# Patient Record
Sex: MALE | Race: WHITE | NOT HISPANIC OR LATINO | Employment: OTHER | URBAN - METROPOLITAN AREA
[De-identification: names, ages, dates, MRNs, and addresses within clinical notes are randomized per-mention and may not be internally consistent; named-entity substitution may affect disease eponyms.]

---

## 2018-04-29 ENCOUNTER — HOSPITAL ENCOUNTER (EMERGENCY)
Facility: HOSPITAL | Age: 38
Discharge: HOME | End: 2018-04-29
Attending: EMERGENCY MEDICINE | Admitting: EMERGENCY MEDICINE

## 2018-04-29 VITALS
RESPIRATION RATE: 20 BRPM | SYSTOLIC BLOOD PRESSURE: 127 MMHG | HEART RATE: 91 BPM | DIASTOLIC BLOOD PRESSURE: 83 MMHG | TEMPERATURE: 99.2 F | OXYGEN SATURATION: 95 % | WEIGHT: 160 LBS | BODY MASS INDEX: 23.7 KG/M2 | HEIGHT: 69 IN

## 2018-04-29 DIAGNOSIS — M76.61 ACHILLES TENDINITIS OF RIGHT LOWER EXTREMITY: Primary | ICD-10-CM

## 2018-04-29 PROCEDURE — 99282 EMERGENCY DEPT VISIT SF MDM: CPT

## 2019-11-05 ENCOUNTER — APPOINTMENT (OUTPATIENT)
Dept: GENERAL RADIOLOGY | Facility: HOSPITAL | Age: 39
End: 2019-11-05

## 2019-11-05 ENCOUNTER — HOSPITAL ENCOUNTER (EMERGENCY)
Facility: HOSPITAL | Age: 39
Discharge: LEFT BEFORE DISCHARGE | End: 2019-11-05
Admitting: EMERGENCY MEDICINE

## 2019-11-05 VITALS
RESPIRATION RATE: 20 BRPM | OXYGEN SATURATION: 100 % | TEMPERATURE: 98.3 F | WEIGHT: 175 LBS | SYSTOLIC BLOOD PRESSURE: 122 MMHG | HEIGHT: 69 IN | HEART RATE: 70 BPM | BODY MASS INDEX: 25.92 KG/M2 | DIASTOLIC BLOOD PRESSURE: 71 MMHG

## 2019-11-05 DIAGNOSIS — R07.9 CHEST PAIN, UNSPECIFIED TYPE: Primary | ICD-10-CM

## 2019-11-05 LAB
ANION GAP SERPL CALCULATED.3IONS-SCNC: 10 MMOL/L (ref 5–15)
BASOPHILS # BLD AUTO: 0.1 10*3/MM3 (ref 0–0.2)
BASOPHILS NFR BLD AUTO: 0.9 % (ref 0–1.5)
BUN BLD-MCNC: 12 MG/DL (ref 6–20)
BUN/CREAT SERPL: 14.6 (ref 7–25)
CALCIUM SPEC-SCNC: 9.8 MG/DL (ref 8.6–10.5)
CHLORIDE SERPL-SCNC: 103 MMOL/L (ref 98–107)
CO2 SERPL-SCNC: 24 MMOL/L (ref 22–29)
CREAT BLD-MCNC: 0.82 MG/DL (ref 0.76–1.27)
DEPRECATED RDW RBC AUTO: 45.1 FL (ref 37–54)
EOSINOPHIL # BLD AUTO: 0.2 10*3/MM3 (ref 0–0.4)
EOSINOPHIL NFR BLD AUTO: 2 % (ref 0.3–6.2)
ERYTHROCYTE [DISTWIDTH] IN BLOOD BY AUTOMATED COUNT: 12.8 % (ref 12.3–15.4)
GFR SERPL CREATININE-BSD FRML MDRD: 105 ML/MIN/1.73
GLUCOSE BLD-MCNC: 107 MG/DL (ref 65–99)
HCT VFR BLD AUTO: 46.6 % (ref 37.5–51)
HGB BLD-MCNC: 16.5 G/DL (ref 13–17.7)
INR PPP: 1.02 (ref 0.9–1.1)
LYMPHOCYTES # BLD AUTO: 2.3 10*3/MM3 (ref 0.7–3.1)
LYMPHOCYTES NFR BLD AUTO: 19 % (ref 19.6–45.3)
MCH RBC QN AUTO: 35.3 PG (ref 26.6–33)
MCHC RBC AUTO-ENTMCNC: 35.4 G/DL (ref 31.5–35.7)
MCV RBC AUTO: 99.7 FL (ref 79–97)
MONOCYTES # BLD AUTO: 0.9 10*3/MM3 (ref 0.1–0.9)
MONOCYTES NFR BLD AUTO: 7.3 % (ref 5–12)
NEUTROPHILS # BLD AUTO: 8.4 10*3/MM3 (ref 1.7–7)
NEUTROPHILS NFR BLD AUTO: 70.8 % (ref 42.7–76)
NRBC BLD AUTO-RTO: 0 /100 WBC (ref 0–0.2)
PLATELET # BLD AUTO: 250 10*3/MM3 (ref 140–450)
PMV BLD AUTO: 8.2 FL (ref 6–12)
POTASSIUM BLD-SCNC: 4.1 MMOL/L (ref 3.5–5.2)
PROTHROMBIN TIME: 10.6 SECONDS (ref 9.6–11.7)
RBC # BLD AUTO: 4.67 10*6/MM3 (ref 4.14–5.8)
SODIUM BLD-SCNC: 137 MMOL/L (ref 136–145)
TROPONIN T SERPL-MCNC: <0.01 NG/ML (ref 0–0.03)
WBC NRBC COR # BLD: 11.9 10*3/MM3 (ref 3.4–10.8)

## 2019-11-05 PROCEDURE — 99284 EMERGENCY DEPT VISIT MOD MDM: CPT

## 2019-11-05 PROCEDURE — 85610 PROTHROMBIN TIME: CPT | Performed by: NURSE PRACTITIONER

## 2019-11-05 PROCEDURE — 84484 ASSAY OF TROPONIN QUANT: CPT | Performed by: NURSE PRACTITIONER

## 2019-11-05 PROCEDURE — 80048 BASIC METABOLIC PNL TOTAL CA: CPT | Performed by: NURSE PRACTITIONER

## 2019-11-05 PROCEDURE — 85025 COMPLETE CBC W/AUTO DIFF WBC: CPT | Performed by: NURSE PRACTITIONER

## 2019-11-05 PROCEDURE — 93005 ELECTROCARDIOGRAM TRACING: CPT

## 2019-11-05 PROCEDURE — 71045 X-RAY EXAM CHEST 1 VIEW: CPT

## 2019-11-05 RX ORDER — ASPIRIN 81 MG/1
324 TABLET, CHEWABLE ORAL ONCE
Status: COMPLETED | OUTPATIENT
Start: 2019-11-05 | End: 2019-11-05

## 2019-11-05 RX ORDER — SODIUM CHLORIDE 0.9 % (FLUSH) 0.9 %
10 SYRINGE (ML) INJECTION AS NEEDED
Status: DISCONTINUED | OUTPATIENT
Start: 2019-11-05 | End: 2019-11-05 | Stop reason: HOSPADM

## 2019-11-05 RX ORDER — CHOLECALCIFEROL (VITAMIN D3) 125 MCG
1000 CAPSULE ORAL DAILY
COMMUNITY
End: 2021-07-19

## 2019-11-05 RX ORDER — MULTIPLE VITAMINS W/ MINERALS TAB 9MG-400MCG
1 TAB ORAL DAILY
COMMUNITY

## 2019-11-05 RX ADMIN — ASPIRIN 81 MG 324 MG: 81 TABLET ORAL at 10:36

## 2019-11-05 RX ADMIN — NITROGLYCERIN 1 INCH: 20 OINTMENT TOPICAL at 10:37

## 2019-11-05 NOTE — ED PROVIDER NOTES
Subjective   Chief Complaint: Chest pain  Context: Patient reports chest pain that started approximately an hour prior to arrival.  He reports he has a history of anxiety/panic attacks but this feels different.  He reports a family history of heart disease with an uncle at age 40.  He denies shortness of breath or nausea.  He denies radiation of pain.  He reports the pain is not reproducible.  He is a smoker.  No prior stress test or heart cath.  Duration: 1 hour  Timing: Constant  Severity: Mild to moderate  Associated symptoms and or modifying factors: As above          History provided by:  Patient      Review of Systems   Constitutional: Negative for chills and fever.   HENT: Negative for congestion and sore throat.    Eyes: Negative for redness.   Respiratory: Negative for cough and shortness of breath.    Cardiovascular: Positive for chest pain.   Gastrointestinal: Negative for abdominal pain, diarrhea, nausea and vomiting.   Genitourinary: Negative for dysuria.   Musculoskeletal: Negative for back pain.   Skin: Negative for rash.   Neurological: Negative for headaches.   All other systems reviewed and are negative.      Past Medical History:   Diagnosis Date   • Kidney stone        Allergies   Allergen Reactions   • Penicillins Unknown (See Comments)     States he is unsure       Past Surgical History:   Procedure Laterality Date   • SHOULDER SURGERY         History reviewed. No pertinent family history.    Social History     Socioeconomic History   • Marital status: Single     Spouse name: Not on file   • Number of children: Not on file   • Years of education: Not on file   • Highest education level: Not on file   Tobacco Use   • Smoking status: Current Every Day Smoker     Packs/day: 0.50     Types: Cigarettes   • Smokeless tobacco: Never Used   Substance and Sexual Activity   • Alcohol use: Yes     Comment: occassionally   • Drug use: Yes     Types: Marijuana     Comment: occassionally   • Sexual activity:  Defer           Objective   Physical Exam  The patient is well-developed, well-nourished, alert, cooperative and in no acute distress.    HEENT exam is normocephalic and atraumatic. Pupils equal ,round, reactive to light. Extraocular muscles are intact. Conjunctivae non- injected, sclerae anicteric, lids without ptosis, edema or erythema.  Mucous membranes are moist.     Neck is supple, non-tender without lymphadenopathy. No JVD, bruit or stridor noted.     Lungs clear to auscultation bilaterally.    Cardiovascular. Regular rate and rhythm without murmur.    Chest is not tender, no subcutaneous emphysema or crepitus felt.     Abdomen is soft, nontender, nondistended. No guarding or rebound noted.     Back shows no CVA tenderness.     Extremities: There is no significant deformity or joint abnormality. No edema.     Neurologic: Oriented x3 without focal neurological deficits.    Skin shows no rash, petechiae, or purpura.    Procedures           ED Course  ED Course as of Nov 05 1145   Tue Nov 05, 2019   1143 BC 11.9, glucose 107, initial troponin is negative.  Chest x-ray shows no acute cardiopulmonary abnormality.  [AC]      ED Course User Index  [AC] Laney Bucio APRN      Labs Reviewed   BASIC METABOLIC PANEL - Abnormal; Notable for the following components:       Result Value    Glucose 107 (*)     All other components within normal limits    Narrative:     GFR Normal >60  Chronic Kidney Disease <60  Kidney Failure <15   CBC WITH AUTO DIFFERENTIAL - Abnormal; Notable for the following components:    WBC 11.90 (*)     MCV 99.7 (*)     MCH 35.3 (*)     Lymphocyte % 19.0 (*)     Neutrophils, Absolute 8.40 (*)     All other components within normal limits   PROTIME-INR - Normal   TROPONIN (IN-HOUSE) - Normal    Narrative:     Troponin T Reference Range:  <= 0.03 ng/mL-   Negative for AMI  >0.03 ng/mL-     Abnormal for myocardial necrosis.  Clinicians would have to utilize clinical acumen, EKG, Troponin and  "serial changes to determine if it is an Acute Myocardial Infarction or myocardial injury due to an underlying chronic condition.    CBC AND DIFFERENTIAL    Narrative:     The following orders were created for panel order CBC & Differential.  Procedure                               Abnormality         Status                     ---------                               -----------         ------                     CBC Auto Differential[[id number]]        Abnormal            Final result                 Please view results for these tests on the individual orders.     Xr Chest 1 View    Result Date: 11/5/2019   1. No acute cardiopulmonary disease.   Electronically Signed By-Juvencio Amin On:11/5/2019 10:52 AM This report was finalized on [id number] by  Juvencio Amin, .    Medications   sodium chloride 0.9 % flush 10 mL (not administered)   aspirin chewable tablet 324 mg (324 mg Oral Given 11/5/19 1036)   nitroglycerin (NITROSTAT) ointment 1 inch (1 inch Topical Given 11/5/19 1037)     /74   Pulse 71   Temp 98.3 °F (36.8 °C)   Resp 19   Ht 175.3 cm (69\")   Wt 79.4 kg (175 lb)   SpO2 98%   BMI 25.84 kg/m²     EKG interpreted by Dr. Garcia, sinus rhythm, rate 76          MDM  Number of Diagnoses or Management Options  Chest pain, unspecified type:   Diagnosis management comments: MEDICAL DECISION  Comorbidities: Anxiety, kidney stones  Differentials: Jaylene/panic attack, STEMI, non-STEMI, pneumonia; this list is not all inclusive and does not constitute the entirety of considered causes  Radiology interpretation:  Images reviewed by me and interpreted by radiologist, chest x-ray no acute cardiopulmonary abnormality  Lab interpretation:  Labs viewed by me significant for, initial troponin is negative.    Results were discussed with patient.  He reports that he is currently pain-free.  He was advised that it would be recommended that he be admitted to the hospital to rule out that this was cardiac in nature.  " He reports that he is unable to state in the hospital because of starting a new job.  He was advised that he can sign out AGAINST MEDICAL ADVICE and that he was taking the risk that this was heart related with worst-case scenario sudden death.  He voices understanding.  He was encouraged to follow-up with primary care as well as cardiology and he was instructed to return to the emergency department for any new or worsening concerns.         Amount and/or Complexity of Data Reviewed  Clinical lab tests: reviewed and ordered  Tests in the radiology section of CPT®: reviewed and ordered  Tests in the medicine section of CPT®: reviewed        Final diagnoses:   Chest pain, unspecified type              Laney Bucio, APRN  11/05/19 1146

## 2019-11-05 NOTE — ED NOTES
Pt states his chest pain started about an hr ago. Pt states he does get anxiety attacks which is what he thought this might be, but that they usually go away and this has not. Pt reports he takes pre work out supplements and thinks it could be a factor     Kush Grant, LPN  11/05/19 1024

## 2019-11-05 NOTE — DISCHARGE INSTRUCTIONS
You are signing out against medical advise. You are taking the risk that this may be your heart.  Follow up in the next 1-2 days with your primary care and cardiologist.  You may follow up with the ones listed if you do not have anyone you routinely see.  Feel free to return to the ER at any time for new or worsening concerns.

## 2019-12-16 ENCOUNTER — HOSPITAL ENCOUNTER (OUTPATIENT)
Dept: HOSPITAL 5 - CT | Age: 39
Discharge: HOME | End: 2019-12-16
Attending: INTERNAL MEDICINE
Payer: OTHER GOVERNMENT

## 2019-12-16 DIAGNOSIS — Z01.89: Primary | ICD-10-CM

## 2019-12-16 DIAGNOSIS — D32.9: ICD-10-CM

## 2019-12-16 LAB — BUN SERPL-MCNC: 9 MG/DL (ref 9–20)

## 2019-12-16 PROCEDURE — 82565 ASSAY OF CREATININE: CPT

## 2019-12-16 PROCEDURE — 70480 CT ORBIT/EAR/FOSSA W/O DYE: CPT

## 2019-12-16 PROCEDURE — 84520 ASSAY OF UREA NITROGEN: CPT

## 2019-12-16 PROCEDURE — 36415 COLL VENOUS BLD VENIPUNCTURE: CPT

## 2019-12-16 NOTE — CAT SCAN REPORT
CT orbit/ear/fossa wo con



INDICATION / CLINICAL INFORMATION:

39 years Male; Z01.89 other specified special examinations.



TECHNIQUE:

Thin cut axial images obtained to the orbits. Sagittal and coronal reconstructions performed. All CT 
scans at this location are performed using CT dose reduction for ALARA by means of automated exposure
 control. 



COMPARISON:

None available.



FINDINGS:

Concerning the orbits, the globes are normal in appearance, as are the extraocular muscles. There is 
a small, well-circumscribed lesion between the optic nerve and superior rectus muscle on the left-sma
ll orbital hemangioma might be considered. This finding measures approximately 6 mm in maximum dimens
ion.



Retro-orbital structures are otherwise grossly normal.



There is some component of fibrous dysplasia and Paget's disease seen in the sphenoid bone on the rig
ht which extends along the lateral orbit. Similar findings to lesser degree seen in the sphenoid wing
 on the left.



Temporal bones are grossly normal in appearance.



There is minimal mucosal thickening in the ethmoids. Mastoid air cells are essentially clear.



IMPRESSION:

1. No definitive cause for patient's symptomatology seen.

2. 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 Roverto
rison with any prior exam or follow-up may be of benefit to ensure stability of this finding.

3. 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
eater than left. 



Signer Name: Esdras Whalen MD, III 

Signed: 12/16/2019 5:59 PM

 Workstation Name: [facility] [id number]

## 2020-05-23 ENCOUNTER — HOSPITAL ENCOUNTER (EMERGENCY)
Facility: HOSPITAL | Age: 40
Discharge: HOME | End: 2020-05-24
Attending: EMERGENCY MEDICINE | Admitting: EMERGENCY MEDICINE

## 2020-05-23 DIAGNOSIS — R00.2 PALPITATIONS: Primary | ICD-10-CM

## 2020-05-23 PROCEDURE — 99283 EMERGENCY DEPT VISIT LOW MDM: CPT

## 2020-05-23 PROCEDURE — 93005 ELECTROCARDIOGRAM TRACING: CPT | Performed by: EMERGENCY MEDICINE

## 2020-05-24 VITALS
DIASTOLIC BLOOD PRESSURE: 81 MMHG | WEIGHT: 179.01 LBS | OXYGEN SATURATION: 99 % | BODY MASS INDEX: 26.51 KG/M2 | RESPIRATION RATE: 16 BRPM | HEART RATE: 88 BPM | TEMPERATURE: 97.9 F | SYSTOLIC BLOOD PRESSURE: 128 MMHG | HEIGHT: 69 IN

## 2020-05-24 NOTE — ED PROVIDER NOTES
Subjective   39-year-old male reports palpitations and anxiety earlier this evening, resolved.  No exertional symptoms.  No chest pain or shortness of air.  Patient drank a pre-workout supplement at 5 PM containing approximately 200 mg of caffeine.  Patient worked out, had no symptoms at that time.  Patient got home and drank some vodka and a another energy drink and experienced fast palpitations and anxiety.  Symptoms resolved.  Symptoms were moderate, worse with caffeine.          Review of Systems   Cardiovascular: Positive for palpitations.   Psychiatric/Behavioral: The patient is nervous/anxious.    All other systems reviewed and are negative.      Past Medical History:   Diagnosis Date   • Kidney stone        Allergies   Allergen Reactions   • Penicillins Unknown (See Comments)     States he is unsure       Past Surgical History:   Procedure Laterality Date   • SHOULDER SURGERY         No family history on file.    Social History     Socioeconomic History   • Marital status: Single     Spouse name: Not on file   • Number of children: Not on file   • Years of education: Not on file   • Highest education level: Not on file   Tobacco Use   • Smoking status: Current Every Day Smoker     Packs/day: 0.50     Types: Cigarettes   • Smokeless tobacco: Never Used   Substance and Sexual Activity   • Alcohol use: Yes     Comment: occassionally   • Drug use: Yes     Types: Marijuana     Comment: occassionally   • Sexual activity: Defer           Objective   Physical Exam   Constitutional: He is oriented to person, place, and time. He appears well-developed and well-nourished.   HENT:   Head: Normocephalic and atraumatic.   Mouth/Throat: Oropharynx is clear and moist.   Eyes: Pupils are equal, round, and reactive to light. Conjunctivae are normal.   Neck: Normal range of motion. Neck supple.   Cardiovascular: Normal rate, regular rhythm and normal heart sounds.   Pulmonary/Chest: Effort normal and breath sounds normal.    Abdominal: Soft. Bowel sounds are normal. He exhibits no distension. There is no tenderness.   Musculoskeletal: Normal range of motion. He exhibits no edema or tenderness.   Neurological: He is alert and oriented to person, place, and time.   Skin: Skin is warm and dry. Capillary refill takes less than 2 seconds.   Psychiatric: He has a normal mood and affect. His behavior is normal.       Procedures           ED Course  ED Course as of May 24 0013   Sun May 24, 2020   0000 EKG interpretation: Normal sinus rhythm, rate 90, no acute ST change    [JR]      ED Course User Index  [JR] Julian Triplett MD                                           [facility]    Final diagnoses:   Palpitations            Julian Triplett MD  05/24/20 0016

## 2020-07-29 ENCOUNTER — HOSPITAL ENCOUNTER (EMERGENCY)
Facility: HOSPITAL | Age: 40
Discharge: HOME | End: 2020-07-29
Attending: EMERGENCY MEDICINE | Admitting: EMERGENCY MEDICINE

## 2020-07-29 ENCOUNTER — APPOINTMENT (OUTPATIENT)
Dept: GENERAL RADIOLOGY | Facility: HOSPITAL | Age: 40
End: 2020-07-29

## 2020-07-29 VITALS
HEIGHT: 69 IN | HEART RATE: 79 BPM | RESPIRATION RATE: 19 BRPM | WEIGHT: 175 LBS | OXYGEN SATURATION: 100 % | DIASTOLIC BLOOD PRESSURE: 76 MMHG | BODY MASS INDEX: 25.92 KG/M2 | SYSTOLIC BLOOD PRESSURE: 108 MMHG | TEMPERATURE: 97.9 F

## 2020-07-29 DIAGNOSIS — R10.9 ABDOMINAL PAIN, UNSPECIFIED ABDOMINAL LOCATION: Primary | ICD-10-CM

## 2020-07-29 DIAGNOSIS — E86.0 DEHYDRATION: ICD-10-CM

## 2020-07-29 LAB
ALBUMIN SERPL-MCNC: 4.7 G/DL (ref 3.5–5.2)
ALBUMIN/GLOB SERPL: 1.9 G/DL
ALP SERPL-CCNC: 68 U/L (ref 39–117)
ALT SERPL W P-5'-P-CCNC: 18 U/L (ref 1–41)
ANION GAP SERPL CALCULATED.3IONS-SCNC: 15 MMOL/L (ref 5–15)
AST SERPL-CCNC: 19 U/L (ref 1–40)
BASOPHILS # BLD AUTO: 0 10*3/MM3 (ref 0–0.2)
BASOPHILS NFR BLD AUTO: 0.3 % (ref 0–1.5)
BILIRUB SERPL-MCNC: 0.6 MG/DL (ref 0–1.2)
BILIRUB UR QL STRIP: ABNORMAL
BUN SERPL-MCNC: 13 MG/DL (ref 6–20)
BUN SERPL-MCNC: ABNORMAL MG/DL
BUN/CREAT SERPL: ABNORMAL
CALCIUM SPEC-SCNC: 9.2 MG/DL (ref 8.6–10.5)
CHLORIDE SERPL-SCNC: 98 MMOL/L (ref 98–107)
CLARITY UR: CLEAR
CO2 SERPL-SCNC: 26 MMOL/L (ref 22–29)
COLOR UR: ABNORMAL
CREAT SERPL-MCNC: 1.17 MG/DL (ref 0.76–1.27)
DEPRECATED RDW RBC AUTO: 45.3 FL (ref 37–54)
EOSINOPHIL # BLD AUTO: 0.1 10*3/MM3 (ref 0–0.4)
EOSINOPHIL NFR BLD AUTO: 1.2 % (ref 0.3–6.2)
ERYTHROCYTE [DISTWIDTH] IN BLOOD BY AUTOMATED COUNT: 13.4 % (ref 12.3–15.4)
GFR SERPL CREATININE-BSD FRML MDRD: 69 ML/MIN/1.73
GLOBULIN UR ELPH-MCNC: 2.5 GM/DL
GLUCOSE SERPL-MCNC: 153 MG/DL (ref 65–99)
GLUCOSE UR STRIP-MCNC: NEGATIVE MG/DL
HCT VFR BLD AUTO: 48 % (ref 37.5–51)
HGB BLD-MCNC: 16.5 G/DL (ref 13–17.7)
HGB UR QL STRIP.AUTO: NEGATIVE
HOLD SPECIMEN: NORMAL
KETONES UR QL STRIP: ABNORMAL
LEUKOCYTE ESTERASE UR QL STRIP.AUTO: NEGATIVE
LIPASE SERPL-CCNC: 22 U/L (ref 13–60)
LYMPHOCYTES # BLD AUTO: 2.7 10*3/MM3 (ref 0.7–3.1)
LYMPHOCYTES NFR BLD AUTO: 28 % (ref 19.6–45.3)
MCH RBC QN AUTO: 33.6 PG (ref 26.6–33)
MCHC RBC AUTO-ENTMCNC: 34.5 G/DL (ref 31.5–35.7)
MCV RBC AUTO: 97.5 FL (ref 79–97)
MONOCYTES # BLD AUTO: 0.8 10*3/MM3 (ref 0.1–0.9)
MONOCYTES NFR BLD AUTO: 8.6 % (ref 5–12)
NEUTROPHILS NFR BLD AUTO: 6.1 10*3/MM3 (ref 1.7–7)
NEUTROPHILS NFR BLD AUTO: 61.9 % (ref 42.7–76)
NITRITE UR QL STRIP: NEGATIVE
NRBC BLD AUTO-RTO: 0.1 /100 WBC (ref 0–0.2)
PH UR STRIP.AUTO: 5.5 [PH] (ref 5–8)
PLATELET # BLD AUTO: 349 10*3/MM3 (ref 140–450)
PMV BLD AUTO: 8.3 FL (ref 6–12)
POTASSIUM SERPL-SCNC: 3.6 MMOL/L (ref 3.5–5.2)
PROT SERPL-MCNC: 7.2 G/DL (ref 6–8.5)
PROT UR QL STRIP: ABNORMAL
RBC # BLD AUTO: 4.92 10*6/MM3 (ref 4.14–5.8)
S PYO AG THROAT QL: NEGATIVE
SODIUM SERPL-SCNC: 139 MMOL/L (ref 136–145)
SP GR UR STRIP: >=1.03 (ref 1–1.03)
UROBILINOGEN UR QL STRIP: ABNORMAL
WBC # BLD AUTO: 9.8 10*3/MM3 (ref 3.4–10.8)
WHOLE BLOOD HOLD SPECIMEN: NORMAL

## 2020-07-29 PROCEDURE — 81003 URINALYSIS AUTO W/O SCOPE: CPT | Performed by: EMERGENCY MEDICINE

## 2020-07-29 PROCEDURE — 71045 X-RAY EXAM CHEST 1 VIEW: CPT

## 2020-07-29 PROCEDURE — 87651 STREP A DNA AMP PROBE: CPT | Performed by: EMERGENCY MEDICINE

## 2020-07-29 PROCEDURE — 85025 COMPLETE CBC W/AUTO DIFF WBC: CPT | Performed by: EMERGENCY MEDICINE

## 2020-07-29 PROCEDURE — 80053 COMPREHEN METABOLIC PANEL: CPT | Performed by: EMERGENCY MEDICINE

## 2020-07-29 PROCEDURE — 25010000002 ONDANSETRON PER 1 MG: Performed by: EMERGENCY MEDICINE

## 2020-07-29 PROCEDURE — 96374 THER/PROPH/DIAG INJ IV PUSH: CPT

## 2020-07-29 PROCEDURE — 83690 ASSAY OF LIPASE: CPT | Performed by: EMERGENCY MEDICINE

## 2020-07-29 PROCEDURE — 99284 EMERGENCY DEPT VISIT MOD MDM: CPT

## 2020-07-29 RX ORDER — ONDANSETRON 4 MG/1
4 TABLET, FILM COATED ORAL EVERY 8 HOURS PRN
Qty: 20 TABLET | Refills: 0 | OUTPATIENT
Start: 2020-07-29 | End: 2020-11-10

## 2020-07-29 RX ORDER — ONDANSETRON 2 MG/ML
4 INJECTION INTRAMUSCULAR; INTRAVENOUS ONCE
Status: COMPLETED | OUTPATIENT
Start: 2020-07-29 | End: 2020-07-29

## 2020-07-29 RX ADMIN — SODIUM CHLORIDE 1000 ML: 900 INJECTION, SOLUTION INTRAVENOUS at 05:18

## 2020-07-29 RX ADMIN — ONDANSETRON 4 MG: 2 INJECTION, SOLUTION INTRAMUSCULAR; INTRAVENOUS at 05:18

## 2020-11-10 ENCOUNTER — HOSPITAL ENCOUNTER (EMERGENCY)
Facility: HOSPITAL | Age: 40
Discharge: HOME | End: 2020-11-10
Admitting: EMERGENCY MEDICINE

## 2020-11-10 VITALS
BODY MASS INDEX: 25.67 KG/M2 | SYSTOLIC BLOOD PRESSURE: 128 MMHG | WEIGHT: 173.28 LBS | TEMPERATURE: 98.1 F | HEART RATE: 86 BPM | DIASTOLIC BLOOD PRESSURE: 88 MMHG | OXYGEN SATURATION: 100 % | RESPIRATION RATE: 18 BRPM | HEIGHT: 69 IN

## 2020-11-10 DIAGNOSIS — F10.21 HISTORY OF ALCOHOLISM (HCC): ICD-10-CM

## 2020-11-10 DIAGNOSIS — Z00.00 NORMAL EXAM: Primary | ICD-10-CM

## 2020-11-10 LAB
ALBUMIN SERPL-MCNC: 4.4 G/DL (ref 3.5–5.2)
ALBUMIN/GLOB SERPL: 1.8 G/DL
ALP SERPL-CCNC: 85 U/L (ref 39–117)
ALT SERPL W P-5'-P-CCNC: 19 U/L (ref 1–41)
ANION GAP SERPL CALCULATED.3IONS-SCNC: 10 MMOL/L (ref 5–15)
AST SERPL-CCNC: 21 U/L (ref 1–40)
BASOPHILS # BLD AUTO: 0 10*3/MM3 (ref 0–0.2)
BASOPHILS NFR BLD AUTO: 0.5 % (ref 0–1.5)
BILIRUB SERPL-MCNC: 0.6 MG/DL (ref 0–1.2)
BUN SERPL-MCNC: 10 MG/DL (ref 6–20)
BUN/CREAT SERPL: 9.5 (ref 7–25)
CALCIUM SPEC-SCNC: 9.3 MG/DL (ref 8.6–10.5)
CHLORIDE SERPL-SCNC: 102 MMOL/L (ref 98–107)
CO2 SERPL-SCNC: 28 MMOL/L (ref 22–29)
CREAT SERPL-MCNC: 1.05 MG/DL (ref 0.76–1.27)
DEPRECATED RDW RBC AUTO: 43.3 FL (ref 37–54)
EOSINOPHIL # BLD AUTO: 0.1 10*3/MM3 (ref 0–0.4)
EOSINOPHIL NFR BLD AUTO: 1.5 % (ref 0.3–6.2)
ERYTHROCYTE [DISTWIDTH] IN BLOOD BY AUTOMATED COUNT: 13.1 % (ref 12.3–15.4)
GFR SERPL CREATININE-BSD FRML MDRD: 78 ML/MIN/1.73
GLOBULIN UR ELPH-MCNC: 2.4 GM/DL
GLUCOSE SERPL-MCNC: 105 MG/DL (ref 65–99)
HCT VFR BLD AUTO: 48.8 % (ref 37.5–51)
HGB BLD-MCNC: 16.8 G/DL (ref 13–17.7)
LYMPHOCYTES # BLD AUTO: 1.7 10*3/MM3 (ref 0.7–3.1)
LYMPHOCYTES NFR BLD AUTO: 20.1 % (ref 19.6–45.3)
MCH RBC QN AUTO: 33.2 PG (ref 26.6–33)
MCHC RBC AUTO-ENTMCNC: 34.5 G/DL (ref 31.5–35.7)
MCV RBC AUTO: 96.1 FL (ref 79–97)
MONOCYTES # BLD AUTO: 0.5 10*3/MM3 (ref 0.1–0.9)
MONOCYTES NFR BLD AUTO: 6.1 % (ref 5–12)
NEUTROPHILS NFR BLD AUTO: 6.2 10*3/MM3 (ref 1.7–7)
NEUTROPHILS NFR BLD AUTO: 71.8 % (ref 42.7–76)
NRBC BLD AUTO-RTO: 0.1 /100 WBC (ref 0–0.2)
PLATELET # BLD AUTO: 274 10*3/MM3 (ref 140–450)
PMV BLD AUTO: 7.5 FL (ref 6–12)
POTASSIUM SERPL-SCNC: 3.9 MMOL/L (ref 3.5–5.2)
PROT SERPL-MCNC: 6.8 G/DL (ref 6–8.5)
RBC # BLD AUTO: 5.07 10*6/MM3 (ref 4.14–5.8)
SODIUM SERPL-SCNC: 140 MMOL/L (ref 136–145)
WBC # BLD AUTO: 8.7 10*3/MM3 (ref 3.4–10.8)

## 2020-11-10 PROCEDURE — 80053 COMPREHEN METABOLIC PANEL: CPT | Performed by: NURSE PRACTITIONER

## 2020-11-10 PROCEDURE — 85025 COMPLETE CBC W/AUTO DIFF WBC: CPT | Performed by: NURSE PRACTITIONER

## 2020-11-10 PROCEDURE — 99283 EMERGENCY DEPT VISIT LOW MDM: CPT

## 2020-11-10 RX ORDER — CHLORDIAZEPOXIDE HYDROCHLORIDE 25 MG/1
25 CAPSULE, GELATIN COATED ORAL 4 TIMES DAILY PRN
Qty: 12 CAPSULE | Refills: 0 | Status: SHIPPED | OUTPATIENT
Start: 2020-11-10 | End: 2020-11-13

## 2020-11-10 NOTE — ED PROVIDER NOTES
Subjective   Chief complaint: etoh withdrawal      Context: Patient is a 40-year-old male who comes in with stating he has been a heavy drinker for the last several years from a couple drinks to a bottle with his last drink yesterday around 8 PM.  He states he has set up for outpatient treatment and AA and is wanting to stop drinking.  He denies any hallucinations paresthesias.. He states he has no history of withdrawal seizures.  He denies any recreational drug use.  He states he was told to come in and get medication to help with the withdrawal from his AA sponsor.  He denies any nausea or vomiting.  He denies any agitation or anxiety.  Denies any sweating.  He denies any headache or hallucinations.  He reports he does have an occasional tremor but none today.    Duration: As above    Timing:    Severity: None    Associated symptoms: Denies          PCP:  fransisco            Review of Systems   Constitutional: Negative for fever.   HENT: Negative.    Eyes: Negative for visual disturbance.   Respiratory: Negative.    Cardiovascular: Negative.    Gastrointestinal: Negative.    Genitourinary: Negative.    Musculoskeletal: Negative.    Skin: Negative.    Allergic/Immunologic: Negative for immunocompromised state.   Neurological: Negative.    Hematological: Does not bruise/bleed easily.   Psychiatric/Behavioral: Negative.        Past Medical History:   Diagnosis Date   • Kidney stone        Allergies   Allergen Reactions   • Penicillins Unknown (See Comments)     States he is unsure       Past Surgical History:   Procedure Laterality Date   • SHOULDER SURGERY         No family history on file.    Social History     Socioeconomic History   • Marital status:      Spouse name: Not on file   • Number of children: Not on file   • Years of education: Not on file   • Highest education level: Not on file   Tobacco Use   • Smoking status: Current Every Day Smoker     Packs/day: 0.50     Types: Cigarettes   • Smokeless  tobacco: Never Used   Substance and Sexual Activity   • Alcohol use: Yes     Comment: occassionally   • Drug use: Yes     Types: Marijuana     Comment: occassionally   • Sexual activity: Defer           Objective   Physical Exam     Vital signs and triage nurse note reviewed.   Constitutional: Awake, alert; well-developed and well-nourished. No acute distress is noted.   HEENT: Normocephalic, atraumatic; pupils are PERRL with intact EOM; oropharynx is pink and moist without exudate or erythema.   Neck: Supple, full range of motion without pain; no cervical lymphadenopathy.   Cardiovascular: Regular rate and rhythm, normal S1-S2.   Pulmonary: Respiratory effort regular nonlabored, breath sounds clear to auscultation all fields.   Abdomen: Soft, nontender nondistended with normoactive bowel sounds; no rebound or guarding.   Musculoskeletal: Independent range of motion of all extremities with no palpable tenderness or edema.   Neuro: Alert oriented x3, speech is clear and appropriate, GCS 15   Skin:  Fleshtone warm, dry, intact; no erythematous or petechial rash or lesion     CIWA score less than 10  Procedures           ED Course            Labs Reviewed   COMPREHENSIVE METABOLIC PANEL - Abnormal; Notable for the following components:       Result Value    Glucose 105 (*)     All other components within normal limits    Narrative:     GFR Normal >60  Chronic Kidney Disease <60  Kidney Failure <15     CBC WITH AUTO DIFFERENTIAL - Abnormal; Notable for the following components:    MCH 33.2 (*)     All other components within normal limits   CBC AND DIFFERENTIAL    Narrative:     The following orders were created for panel order CBC & Differential.  Procedure                               Abnormality         Status                     ---------                               -----------         ------                     CBC Auto Differential[[id number]]        Abnormal            Final result                 Please view  results for these tests on the individual orders.     Medications - No data to display  No radiology results for the last day    BAT 0.00                                  MDM  Number of Diagnoses or Management Options  History of alcoholism (CMS/[facility]):   Normal exam:   Diagnosis management comments:     Comorbidities:  has a past medical history of Kidney stone alcoholism.  Differentials: Electrolyte abnormalities not all inclusive of differentials considered  Lab interpretation:  Labs viewed by me significant for, negative    Appropriate PPE worn during exam.  The potential for seizures during withdrawal    i discussed findings with patient who voices understanding of discharge instructions, signs and symptoms requiring return to ED; discharged improved and in stable condition with follow up for re-evaluation.  Inspect reviewed.  Patient be discharged home with 3 days of as needed Librium      Final diagnoses:   Normal exam   History of alcoholism (CMS/[facility])            Fabi Canela, APRN  11/10/20 8231

## 2021-01-01 ENCOUNTER — APPOINTMENT (OUTPATIENT)
Dept: GENERAL RADIOLOGY | Facility: HOSPITAL | Age: 41
End: 2021-01-01

## 2021-01-01 ENCOUNTER — HOSPITAL ENCOUNTER (EMERGENCY)
Facility: HOSPITAL | Age: 41
Discharge: HOME | End: 2021-01-01
Attending: EMERGENCY MEDICINE | Admitting: EMERGENCY MEDICINE

## 2021-01-01 VITALS
HEART RATE: 75 BPM | HEIGHT: 69 IN | OXYGEN SATURATION: 100 % | SYSTOLIC BLOOD PRESSURE: 129 MMHG | DIASTOLIC BLOOD PRESSURE: 75 MMHG | RESPIRATION RATE: 15 BRPM | TEMPERATURE: 98 F | WEIGHT: 173.28 LBS | BODY MASS INDEX: 25.67 KG/M2

## 2021-01-01 DIAGNOSIS — S16.1XXA STRAIN OF NECK MUSCLE, INITIAL ENCOUNTER: Primary | ICD-10-CM

## 2021-01-01 DIAGNOSIS — S29.019A THORACIC MYOFASCIAL STRAIN, INITIAL ENCOUNTER: ICD-10-CM

## 2021-01-01 DIAGNOSIS — S39.012A LUMBOSACRAL STRAIN, INITIAL ENCOUNTER: ICD-10-CM

## 2021-01-01 DIAGNOSIS — S50.00XA CONTUSION OF ELBOW, UNSPECIFIED LATERALITY, INITIAL ENCOUNTER: ICD-10-CM

## 2021-01-01 PROCEDURE — 72110 X-RAY EXAM L-2 SPINE 4/>VWS: CPT

## 2021-01-01 PROCEDURE — 72050 X-RAY EXAM NECK SPINE 4/5VWS: CPT

## 2021-01-01 PROCEDURE — 73070 X-RAY EXAM OF ELBOW: CPT

## 2021-01-01 PROCEDURE — 99283 EMERGENCY DEPT VISIT LOW MDM: CPT

## 2021-01-01 PROCEDURE — 72072 X-RAY EXAM THORAC SPINE 3VWS: CPT

## 2021-01-01 PROCEDURE — 71046 X-RAY EXAM CHEST 2 VIEWS: CPT

## 2021-01-01 RX ORDER — HYDROCODONE BITARTRATE AND ACETAMINOPHEN 7.5; 325 MG/1; MG/1
1 TABLET ORAL ONCE
Status: COMPLETED | OUTPATIENT
Start: 2021-01-01 | End: 2021-01-01

## 2021-01-01 RX ORDER — CYCLOBENZAPRINE HCL 10 MG
10 TABLET ORAL 2 TIMES DAILY PRN
Qty: 20 TABLET | Refills: 0 | Status: SHIPPED | OUTPATIENT
Start: 2021-01-01 | End: 2021-03-26

## 2021-01-01 RX ORDER — DICLOFENAC SODIUM 75 MG/1
75 TABLET, DELAYED RELEASE ORAL 2 TIMES DAILY
Qty: 20 TABLET | Refills: 0 | Status: SHIPPED | OUTPATIENT
Start: 2021-01-01 | End: 2021-03-26

## 2021-01-01 RX ADMIN — HYDROCODONE BITARTRATE AND ACETAMINOPHEN 1 TABLET: 7.5; 325 TABLET ORAL at 14:09

## 2021-01-01 NOTE — ED PROVIDER NOTES
Subjective   Chief complaint: Patient is a pleasant 4-year-old male.  He states that he was taking the laundry out yesterday to take it to the laundry and slipped at 8 AM on an icy wooden step.  He fell down the steps.  He could not brace himself.  He landed on his back.  No head injury no loss of consciousness.  Got bilateral elbow pain any has pain from his lower C-spine all the way down to his lumbar.  No numbness no weakness.    Context: As above slipped on icy steps    Duration: 1 day    Timing: Persistent    Severity: Patient states pain is moderate but he has been able to ambulate.    Associated Symptoms: Negative except as noted above.  Appropriate PPE was used.        PCP:          Review of Systems   Constitutional: Negative.    Eyes: Negative.    Respiratory: Negative.    Cardiovascular: Negative.    Gastrointestinal: Negative.    Genitourinary: Negative.    Musculoskeletal: Positive for arthralgias, back pain and neck pain.   Neurological: Negative for weakness and headaches.   Psychiatric/Behavioral: Negative.        Past Medical History:   Diagnosis Date   • Kidney stone        Allergies   Allergen Reactions   • Penicillins Unknown (See Comments)     States he is unsure       Past Surgical History:   Procedure Laterality Date   • SHOULDER SURGERY         No family history on file.    Social History     Socioeconomic History   • Marital status:      Spouse name: Not on file   • Number of children: Not on file   • Years of education: Not on file   • Highest education level: Not on file   Tobacco Use   • Smoking status: Current Every Day Smoker     Packs/day: 0.50     Types: Cigarettes   • Smokeless tobacco: Never Used   Substance and Sexual Activity   • Alcohol use: Yes     Comment: occassionally   • Drug use: Yes     Types: Marijuana     Comment: occassionally   • Sexual activity: Defer           Objective   Physical Exam  Vitals signs and nursing note reviewed.   Constitutional:        Appearance: Normal appearance.   HENT:      Head: Normocephalic and atraumatic.   Eyes:      Extraocular Movements: Extraocular movements intact.      Pupils: Pupils are equal, round, and reactive to light.   Neck:      Musculoskeletal: Spinous process tenderness and muscular tenderness present.     Cardiovascular:      Rate and Rhythm: Normal rate and regular rhythm.   Pulmonary:      Effort: Pulmonary effort is normal.      Breath sounds: Normal breath sounds.   Musculoskeletal: Normal range of motion.      Right elbow: He exhibits normal range of motion and no swelling. Tenderness found.      Cervical back: He exhibits tenderness.      Thoracic back: He exhibits tenderness.      Lumbar back: He exhibits tenderness.        Back:         Arms:    Skin:     General: Skin is warm and dry.      Capillary Refill: Capillary refill takes less than 2 seconds.   Neurological:      General: No focal deficit present.      Mental Status: He is alert and oriented to person, place, and time.      Cranial Nerves: No cranial nerve deficit.      Sensory: No sensory deficit.      Motor: No weakness.      Coordination: Coordination normal.      Deep Tendon Reflexes: Reflexes normal.   Psychiatric:         Mood and Affect: Mood normal.         Behavior: Behavior normal.         Thought Content: Thought content normal.         Judgment: Judgment normal.         Procedures           ED Course          Xr Chest 2 View    Result Date: 1/1/2021  No active disease.  Electronically Signed By-Hong Zurita MD On:1/1/2021 2:32 PM This report was finalized on [id number] by  Hong Zurita MD.    Xr Spine Cervical Complete 4 Or 5 View    Result Date: 1/1/2021   1. C5-C6 and C6-C7 cervical spondylosis. 2. No acute findings.  Electronically Signed By-Hong Zurita MD On:1/1/2021 2:42 PM This report was finalized on [id number] by  Hong Zurita MD.    Xr Spine Thoracic 3 View    Result Date: 1/1/2021  No acute findings.  Electronically Signed  By-Hong Zurita MD On:1/1/2021 2:32 PM This report was finalized on [id number] by  Hong Zurita MD.    Xr Elbow 2 View Bilateral    Result Date: 1/1/2021  Normal study.  Electronically Signed By-Hong Zurita MD On:1/1/2021 2:41 PM This report was finalized on [id number] by  Hong Zurita MD.    Xr Spine Lumbar Complete 4+vw    Result Date: 1/1/2021  Grade 1 anterior spondylolisthesis of L5 on S1 measuring 4 mm and secondary to chronic pars defects.  Electronically Signed By-Hong Zurita MD On:1/1/2021 2:34 PM This report was finalized on [id number] by  Hong Zurita MD.                                      MDM  Number of Diagnoses or Management Options  Diagnosis management comments: Patient shows no neurologic abnormalities on exam.  He shows no fractures or dislocations on his x-rays.  This point time I will treated symptomatically for pain and have him follow-up outpatient.  Have him return for any worsening symptoms signs or concerns.  Patient is agreeable with the plan       Amount and/or Complexity of Data Reviewed  Tests in the radiology section of CPT®: reviewed    Patient Progress  Patient progress: stable      Final diagnoses:   None     Status post fall  Cervical strain  Thoracic strain  Lumbar strain       Yvon Galloway,   01/01/21 1442

## 2021-01-01 NOTE — ED NOTES
Patient slipped on ice yesterday and fell down steps. Complains of pain in neck and back.     Mishel Johnson RN  01/01/21 9478

## 2021-03-26 ENCOUNTER — OFFICE VISIT (OUTPATIENT)
Dept: ORTHOPEDIC SURGERY | Facility: CLINIC | Age: 41
End: 2021-03-26

## 2021-03-26 VITALS
BODY MASS INDEX: 25.62 KG/M2 | DIASTOLIC BLOOD PRESSURE: 91 MMHG | SYSTOLIC BLOOD PRESSURE: 136 MMHG | WEIGHT: 173 LBS | HEIGHT: 69 IN | HEART RATE: 74 BPM

## 2021-03-26 DIAGNOSIS — M77.01 MEDIAL EPICONDYLITIS OF ELBOW, RIGHT: Primary | ICD-10-CM

## 2021-03-26 PROCEDURE — 99203 OFFICE O/P NEW LOW 30 MIN: CPT | Performed by: ORTHOPAEDIC SURGERY

## 2021-03-26 NOTE — PROGRESS NOTES
Sergey Jason     : 1980     MRN: [id number]     DATE: 3/26/2021    Chief Complaints:  Right elbow pain    History of Present Illness:     40 y.o. male patient who presents for evaluation of the right elbow.  The patient reports that the symptoms began back in December after he was carrying a laundry basket and fell.  States he is very active continues have some medial sided pain he works doing [employer] and has not really been able to rest it.  Still has some medial pain states getting worse..  Pain is described as moderate, intermittent and stabbing.  The pain is associated with certain reaching and lifting movements.  The patient localizes the pain to the medial aspect of the elbow.  He has not been able to rest much due to the nature of his job states anti-inflammatories have not helped.  Denies any numbness or tingling.  Patient is left-hand dominant.    Allergies:   Allergies   Allergen Reactions   • Penicillins Unknown (See Comments)     States he is unsure        Home Medications:    Current Outpatient Medications:   •  Multiple Vitamins-Minerals (MULTIVITAMIN WITH MINERALS) tablet tablet, Take 1 tablet by mouth Daily., Disp: , Rfl:   •  NON FORMULARY, As Needed. Pre Workout (Psychotic), Disp: , Rfl:   •  NON FORMULARY, Daily. Alpha Male (testosterone boost), Disp: , Rfl:   •  vitamin B-12 (CYANOCOBALAMIN) 500 MCG tablet, Take 1,000 mcg by mouth Daily., Disp: , Rfl:   Past Medical History:   Diagnosis Date   • Kidney stone      Past Surgical History:   Procedure Laterality Date   • SHOULDER SURGERY       Social History     Occupational History   • Not on file   Tobacco Use   • Smoking status: Current Every Day Smoker     Packs/day: 0.50     Types: Cigarettes   • Smokeless tobacco: Never Used   Substance and Sexual Activity   • Alcohol use: Yes     Comment: occassionally   • Drug use: Yes     Types: Marijuana     Comment: occassionally   • Sexual activity: Defer      Social History     Social  "History Narrative   • Not on file     History reviewed. No pertinent family history.    Review of Systems:      Constitutional: Denies fever, shaking or chills   Eyes: Denies change in visual acuity   HEENT: Denies nasal congestion or sore throat   Respiratory: Denies cough or shortness of breath   Cardiovascular: Denies chest pain or edema  Endocrine: Denies tremors, palpitations, intolerance of heat or cold, polyuria, polydipsia.  GI: Denies abdominal pain, nausea, vomiting, bloody stools or diarrhea  : Denies frequency, urgency, incontinence, retention, or nocturia.  Musculoskeletal: Denies numbness, tingling or loss of motor function except as above  Integument: Denies rash, lesion or ulceration   Neurologic: Denies headache or focal weakness, deficits  Heme: Denies spontaneous or excessive bleeding, epistaxis, hematuria, melena, fatigue, enlarged or tender lymph nodes.      All other pertinent positives and negatives as noted above in HPI.    Physical Exam: 40 y.o. male    Vitals:    03/26/21 0812   BP: 136/91   Pulse: 74   Weight: 78.5 kg (173 lb)   Height: 175.3 cm (69\")     General:  Patient is awake and alert.  Appears in no acute distress or discomfort.    Psych:  Affect and demeanor are appropriate.    Eyes:  Conjunctiva and sclera appear grossly normal.  Eyes track well and EOM seem to be intact.    Ears:  No gross abnormalities.  Hearing adequate for the exam.    Cardiovascular:  Regular rate and rhythm.    Lungs:  Good chest expansion.  Breathing unlabored.    Extremities:  Right elbow skin appears benign.  No obvious lesions, swellings, masses or adenopathy.  Focal tenderness noted over the medial epicondyle.  No tenderness on the lateral side or the remainder of the elbow.  Full elbow motion.  No instability.  Good strength with elbow flexion, extension, supination, pronation.  Pronation and resisted wrist flexion are moderately uncomfortable.    Good strength in the hand with  and pinch.  " Sensation is intact.  Palpable radial pulse with good skin turgor and brisk capillary refill.    DIAGNOSTIC STUDIES    Xrays:  AP and lateral views of the right elbow were reviewed to evaluate the patient's complaint.  No comparison films are immediately available.  The x-rays show no obvious acute abnormalities, lesions, masses, significant degenerative changes, or other concerning findings.    ASSESSMENT: Right elbow medial epicondylitis    PLAN:  We discussed options in detail, both surgical and non-surgical.  I have recommended that we start with a conservative approach.  We discussed all available conservative treatment options including activity modifications, bracing, anti-inflammatories, physical therapy, and injections.  I explained the likely short-term benefits of cortisone injection and the associated risks.    The patient acknowledged understanding of this information.  He has elected for to start with conservative treatment consisting of physical therapy, bracing and continue anti-inflammatories and possible use of over-the-counter anti-inflammatory gel.  Going forward the patient will follow up with Dr. Tidwell in 3 to 4 weeks just to see how he is doing and evaluate whether a injection would be beneficial.  Told the patient if conservative management fails over the next few months to let me know as may consider advanced imaging for further evaluation.  The patient understands and agrees.     Ananth Perez MD    03/26/2021    CC to Julian Ruth MD    Procedures

## 2021-04-13 ENCOUNTER — TREATMENT (OUTPATIENT)
Dept: PHYSICAL THERAPY | Facility: CLINIC | Age: 41
End: 2021-04-13

## 2021-04-13 DIAGNOSIS — M77.01 MEDIAL EPICONDYLITIS OF RIGHT ELBOW: Primary | ICD-10-CM

## 2021-04-13 PROCEDURE — 97161 PT EVAL LOW COMPLEX 20 MIN: CPT | Performed by: PHYSICAL THERAPIST

## 2021-04-13 PROCEDURE — 97110 THERAPEUTIC EXERCISES: CPT | Performed by: PHYSICAL THERAPIST

## 2021-04-13 PROCEDURE — 97140 MANUAL THERAPY 1/> REGIONS: CPT | Performed by: PHYSICAL THERAPIST

## 2021-04-13 NOTE — PROGRESS NOTES
Physical Therapy Initial Evaluation and Plan of Care    Patient: Sergey Jason   : 1980  Diagnosis/ICD-10 Code:  Medial epicondylitis of right elbow [M77.01]  Referring practitioner: Ananth Perez MD  Date of Initial Visit: 2021  Today's Date: 2021  Patient seen for 1 sessions           Subjective Questionnaire: QuickDASH: 50%  Work module: 62%      Subjective Evaluation    History of Present Illness  Mechanism of injury: Pt is referred to therapy due to pain in medial aspects of R elbow.  Onset of symptoms in  after falling down some steps. He was carrying a laundry basket and fell down the steps hitting the R elbow. Had x-rays and it was normal.     Symptoms worse with lifting and bending the elbow and at night if keep it bent too long.  Has not been able to do his regular work out and his job. 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     Better: not using it    Functional limitation: doing his job and related tasks, unable to work out, carrying bucket of paint and lifting bags of mulch etc    PMH: achillis tendon tear, L RC/ biceps tear and repair surgery        Quality of life: good    Pain  Current pain ratin  At best pain ratin  At worst pain ratin  Quality: sharp  Aggravating factors: lifting, repetitive movement, prolonged positioning and movement  Progression: worsening    Hand dominance: left    Diagnostic Tests  X-ray: normal    Patient Goals  Patient goals for therapy: decreased pain and increased motion           Precautions:     Objective          Palpation     Right Tenderness of the biceps, brachialis and wrist flexors.     Tenderness     Right Elbow   Tenderness in the medial epicondyle.     Right Wrist/Hand   Tenderness in the medial epicondyle.     Active Range of Motion     Left Elbow   Normal active range of motion    Right Elbow   Normal active range of motion    Strength/Myotome Testing     Left Elbow    Normal strength    Right Elbow   Flexion: 4  Extension: 4    Additional Strength Details  Inc pain with resisted wrist flex/ ext     strength :   L 70 lbs w elbow flexed/   75 elbow extended   R : 60 lbs elbow flexed/ 70 elbow extended    Tests     Right Elbow   Positive elbow flexion.           Assessment & Plan     Assessment  Impairments: activity intolerance, lacks appropriate home exercise program and pain with function  Assessment details: Pt is a 41 y/o m referred to therapy with Dx of medial epicondylithis. Pt presents with dec  strength, dec R wrist strength due pain,  TTP medial aspects of R elbow, inc pain with using R arm and lifting. . Upon initial evaluation pt exhibits the above impairments and functional limitations. Impairments affect performing his job and his usual work out routin. Pt will benefit from skilled physical therapy to address impairments, resolve pain and maximize function.   Prognosis: good  Functional Limitations: carrying objects, lifting, sleeping, pulling, pushing, uncomfortable because of pain and unable to perform repetitive tasks  Goals  Plan Goals: STGs: in 4 weeks    1-  Pt will be I with initial HEP  2- Pt will nicki progression of his ex program and HEP  3- Pt will report at least 35% improvement and pain reduction    LTGs: by DC     1- Pt will reports at least 75 % improvement and pain reduction  2- Pt will be I with final HEP and self management of his condition  3- Pt with have improved DASH score compare to initial score indicating pain reduction and improved functional use of R arm  4- Pt will be able to resume his regular work out routine  5- Pt will be able to perf his job with min pain       Plan  Therapy options: will be seen for skilled physical therapy services  Planned modality interventions: ultrasound  Planned therapy interventions: functional ROM exercises, home exercise program, manual therapy, neuromuscular re-education, strengthening, stretching  and therapeutic activities  Frequency: 2x week  Treatment plan discussed with: patient  Plan details: 20 visits        See flow sheet for treatment detail    History # of Personal Factors and/or Comorbidities: LOW (0)  Examination of Body System(s): # of elements: MODERATE (3)  Clinical Presentation: STABLE   Clinical Decision Making: LOW           Timed:         Manual Therapy:   15      mins  32971;     Therapeutic Exercise:   10      mins  50100;     Neuromuscular Faustino:        mins  26534;    Therapeutic Activity:          mins  73226;     Gait Training:           mins  39931;     Ultrasound:          mins  79088;    Ionto                                   mins   22319  Self Care                            mins   61895  Canal repositioning           mins    19200      Un-Timed:  Electrical Stimulation:         mins  91061 ( );  Dry Needling          mins self-pay  Traction          mins 90620  Low Eval    20      Mins  41151  Mod Eval          Mins  42646  High Eval                            Mins  05364  Re-Eval                               mins  25025        Timed Treatment:  25    mins   Total Treatment:     45   mins    PT SIGNATURE: Winston Grant PT, CLT   DATE TREATMENT INITIATED: 4/13/2021    Initial Certification  Certification Period: 7/12/2021  I certify that the therapy services are furnished while this patient is under my care.  The services outlined above are required by this patient, and will be reviewed every 90 days.     PHYSICIAN: Ananth Perez MD      DATE:     Please sign and return via fax to [phone].. Thank you, [facility].

## 2021-04-16 ENCOUNTER — OFFICE VISIT (OUTPATIENT)
Dept: ORTHOPEDIC SURGERY | Facility: CLINIC | Age: 41
End: 2021-04-16

## 2021-04-16 VITALS
SYSTOLIC BLOOD PRESSURE: 125 MMHG | BODY MASS INDEX: 26.22 KG/M2 | HEART RATE: 75 BPM | HEIGHT: 69 IN | DIASTOLIC BLOOD PRESSURE: 85 MMHG | WEIGHT: 177 LBS

## 2021-04-16 DIAGNOSIS — M77.01 MEDIAL EPICONDYLITIS, RIGHT: Primary | ICD-10-CM

## 2021-04-16 PROCEDURE — 20550 NJX 1 TENDON SHEATH/LIGAMENT: CPT | Performed by: FAMILY MEDICINE

## 2021-04-16 PROCEDURE — 99214 OFFICE O/P EST MOD 30 MIN: CPT | Performed by: FAMILY MEDICINE

## 2021-04-16 RX ORDER — MELOXICAM 15 MG/1
15 TABLET ORAL DAILY PRN
Qty: 30 TABLET | Refills: 4 | Status: SHIPPED | OUTPATIENT
Start: 2021-04-16 | End: 2021-11-16

## 2021-04-16 RX ORDER — TRIAMCINOLONE ACETONIDE 40 MG/ML
40 INJECTION, SUSPENSION INTRA-ARTICULAR; INTRAMUSCULAR
Status: COMPLETED | OUTPATIENT
Start: 2021-04-16 | End: 2021-04-16

## 2021-04-16 RX ADMIN — TRIAMCINOLONE ACETONIDE 40 MG: 40 INJECTION, SUSPENSION INTRA-ARTICULAR; INTRAMUSCULAR at 10:14

## 2021-04-16 NOTE — PROGRESS NOTES
"Procedure   Injection Tendon or Ligament    Date/Time: 4/16/2021 10:14 AM  Performed by: Angel Tidwell II, DO  Authorized by: Angel Tidwell II, DO   Preparation: Patient was prepped and draped in the usual sterile fashion.  Local anesthesia used: no    Anesthesia:  Local anesthesia used: no    Sedation:  Patient sedated: no    Patient tolerance: patient tolerated the procedure well with no immediate complications  Comments: After risk and benefits were discussed, the patient was identified, and the right elbow was prepped and draped in usual sterile fashion, corticosteroid injection of the insertion of the conjoined flexor tendon at the elbow was performed.  The skin was cleansed with alcohol and Betadine.  Ethyl chloride spray was used for skin anesthesia.  A 25-gauge needle was then used to infiltrate the origin of the flexor tendon.  1 cc of 40 mg of Kenalog, and 1 cc of lidocaine was then injected.  The needle was removed without any complication.  Blood loss is 0.  Patient tolerated this procedure well.  A bandage was placed.    Angel DORADO \"Chance\" Yaw FOREMAN DO, CAQSM  04/16/21  10:16 EDT    Medications administered: 40 mg triamcinolone acetonide 40 MG/ML           "

## 2021-04-16 NOTE — PROGRESS NOTES
"Primary Care Sports Medicine Office Visit Note     Patient ID: Sergey Jason is a 40 y.o. male.    Chief Complaint:  Chief Complaint   Patient presents with   • Right Elbow - Pain     HPI:    Mr. Sergey Jason is a 40 y.o. male who presents to the clinic today for elbow pain. In late December he was walking up some snowy steps, and slipped. Landed on his back with posterior elbows/olecranon striking the stair. He was seen by my parter near 3 weeks ago. Diagnosed with medial epicondylitis. Given a brace, started PT. Has been getting mildly better. No tingling, no numbness. No elbow instability. Pain with forearm flexion. Better with rest.     Past Medical History:   Diagnosis Date   • Kidney stone        Past Surgical History:   Procedure Laterality Date   • SHOULDER SURGERY         No family history on file.  Social History     Occupational History   • Not on file   Tobacco Use   • Smoking status: Current Every Day Smoker     Packs/day: 0.50     Types: Cigarettes   • Smokeless tobacco: Never Used   Substance and Sexual Activity   • Alcohol use: Yes     Comment: occassionally   • Drug use: Yes     Types: Marijuana     Comment: occassionally   • Sexual activity: Defer      Review of Systems   Constitutional: Negative for activity change and fever.   Respiratory: Negative for cough and shortness of breath.    Cardiovascular: Negative for chest pain.   Gastrointestinal: Negative for constipation, diarrhea, nausea and vomiting.   Musculoskeletal: Positive for arthralgias.   Skin: Negative for color change and rash.   Neurological: Negative for weakness.   Hematological: Does not bruise/bleed easily.     Objective:    /85   Pulse 75   Ht 175.3 cm (69\")   Wt 80.3 kg (177 lb)   BMI 26.14 kg/m²     Physical Examination:  Physical Exam  Vitals and nursing note reviewed.   Constitutional:       General: He is not in acute distress.     Appearance: He is well-developed. He is not diaphoretic.   HENT:      Head: " "Normocephalic and atraumatic.   Eyes:      Conjunctiva/sclera: Conjunctivae normal.   Pulmonary:      Effort: Pulmonary effort is normal. No respiratory distress.   Skin:     General: Skin is warm.      Capillary Refill: Capillary refill takes less than 2 seconds.   Neurological:      Mental Status: He is alert.       Right Elbow Exam     Tenderness   The patient is experiencing tenderness in the medial epicondyle.     Range of Motion   Extension: normal   Flexion: normal   Pronation: normal   Supination: normal     Muscle Strength   Pronation:  5/5   Supination:  5/5     Tests   Tinel's sign (cubital tunnel): negative    Other   Erythema: absent  Sensation: normal  Pulse: present    Comments:  Pain with resisted pronation, resisted wrist flexion        Imaging and other tests:  No new imaging today.  2-view XR bilateral elbows dated 1/1/2021 reviewed.  No acute bony abnormality.    Assessment and Plan:    1. Medial epicondylitis, right    After discussion of risks and benefits, the patient elected to proceed with corticosteroid injection to the insertion of conjoined flexor tendon at the medial elbow.  The patient tolerated this procedure well without any complaints or problems.  I recommended continuation of conservative management as previous, RTC in 3-6 months or sooner if symptoms recur.    Angel DORADO \"Chance\" Yaw FOREMAN DO, CAQSM  04/16/21  10:13 EDT    Disclaimer: Please note that areas of this note were completed with computer voice recognition software.  Quite often unanticipated grammatical, syntax, homophones, and other interpretive errors are inadvertently transcribed by the computer software. Please excuse any errors that have escaped final proofreading.  "

## 2021-04-20 ENCOUNTER — HOSPITAL ENCOUNTER (EMERGENCY)
Facility: HOSPITAL | Age: 41
Discharge: HOME | End: 2021-04-20
Attending: EMERGENCY MEDICINE | Admitting: EMERGENCY MEDICINE

## 2021-04-20 VITALS
HEART RATE: 64 BPM | HEIGHT: 69 IN | TEMPERATURE: 98.1 F | WEIGHT: 178.35 LBS | OXYGEN SATURATION: 99 % | DIASTOLIC BLOOD PRESSURE: 78 MMHG | SYSTOLIC BLOOD PRESSURE: 128 MMHG | RESPIRATION RATE: 16 BRPM | BODY MASS INDEX: 26.42 KG/M2

## 2021-04-20 DIAGNOSIS — F41.9 ANXIETY: Primary | ICD-10-CM

## 2021-04-20 DIAGNOSIS — F10.21 ALCOHOL DEPENDENCE IN REMISSION (HCC): ICD-10-CM

## 2021-04-20 PROCEDURE — 99283 EMERGENCY DEPT VISIT LOW MDM: CPT

## 2021-04-20 RX ORDER — CHLORDIAZEPOXIDE HYDROCHLORIDE 25 MG/1
25 CAPSULE, GELATIN COATED ORAL ONCE
Status: COMPLETED | OUTPATIENT
Start: 2021-04-20 | End: 2021-04-20

## 2021-04-20 RX ORDER — CHLORDIAZEPOXIDE HYDROCHLORIDE 25 MG/1
CAPSULE, GELATIN COATED ORAL
Status: DISCONTINUED
Start: 2021-04-20 | End: 2021-04-20 | Stop reason: HOSPADM

## 2021-04-20 RX ORDER — CHLORDIAZEPOXIDE HYDROCHLORIDE 25 MG/1
25 CAPSULE, GELATIN COATED ORAL 3 TIMES DAILY PRN
Qty: 9 CAPSULE | Refills: 0 | Status: SHIPPED | OUTPATIENT
Start: 2021-04-20 | End: 2021-04-23

## 2021-04-20 RX ADMIN — CHLORDIAZEPOXIDE HYDROCHLORIDE 25 MG: 25 CAPSULE ORAL at 02:04

## 2021-04-20 NOTE — ED NOTES
"Patient states that he has been sober for 150 day until last week when he went on a four day laird. He states that he has been \"giving himself a hard way to go\" every since.    Pt states that he has a new sponsor and is going to continue going to .      Karin Rodriguez, LPN  04/20/21 0143    "

## 2021-04-20 NOTE — ED PROVIDER NOTES
Subjective   Patient is a 40-year-old male who states he was an alcoholic and is recovering and states he was clean for 150 days he states that this past weekend he fell off the wan had an alcohol binge weekend and states that today he had an anxiety attack he states that he is fearful of withdrawing again he is requesting some Librium to help him withdrawal and get over the symptoms of detox.  He states this is what he used last time for only 3 days and it was helpful.  He is alert oriented cordial in no acute distress          Review of Systems   Constitutional: Negative for chills, fatigue and fever.   HENT: Negative for congestion, tinnitus and trouble swallowing.    Eyes: Negative for photophobia, discharge and redness.   Respiratory: Negative for cough and shortness of breath.    Cardiovascular: Negative for chest pain and palpitations.   Gastrointestinal: Negative for abdominal pain, diarrhea, nausea and vomiting.   Genitourinary: Negative for dysuria, frequency and urgency.   Musculoskeletal: Negative for back pain, joint swelling and myalgias.   Skin: Negative for rash.   Neurological: Negative for dizziness and headaches.   Psychiatric/Behavioral: Negative for confusion. The patient is nervous/anxious.    All other systems reviewed and are negative.      Past Medical History:   Diagnosis Date   • Kidney stone        Allergies   Allergen Reactions   • Penicillins Unknown (See Comments)     States he is unsure       Past Surgical History:   Procedure Laterality Date   • SHOULDER SURGERY         No family history on file.    Social History     Socioeconomic History   • Marital status:      Spouse name: Not on file   • Number of children: Not on file   • Years of education: Not on file   • Highest education level: Not on file   Tobacco Use   • Smoking status: Current Every Day Smoker     Packs/day: 0.50     Types: Cigarettes   • Smokeless tobacco: Never Used   Substance and Sexual Activity   •  Alcohol use: Yes     Comment: occassionally   • Drug use: Yes     Types: Marijuana     Comment: occassionally   • Sexual activity: Defer           Objective   Physical Exam  Vitals reviewed.   Constitutional:       Appearance: He is well-developed.   HENT:      Head: Normocephalic and atraumatic.      Mouth/Throat:      Pharynx: Oropharynx is clear. Uvula midline.      Comments: Patient has very poor dentition with multiple caries  Eyes:      Conjunctiva/sclera: Conjunctivae normal.      Pupils: Pupils are equal, round, and reactive to light.   Cardiovascular:      Rate and Rhythm: Normal rate and regular rhythm.      Heart sounds: Normal heart sounds.   Pulmonary:      Effort: Pulmonary effort is normal.      Breath sounds: Normal breath sounds.   Abdominal:      General: Bowel sounds are normal.      Palpations: Abdomen is soft.   Musculoskeletal:         General: Normal range of motion.      Cervical back: Normal range of motion and neck supple.   Skin:     General: Skin is warm and dry.   Neurological:      Mental Status: He is alert and oriented to person, place, and time.      GCS: GCS eye subscore is 4. GCS verbal subscore is 5. GCS motor subscore is 6.         Procedures           ED Course  ED Course as of Apr 20 0158   Tue Apr 20, 2021   0146 Consulted Yajaira the Pharm.D. downstairs to get proper Librium dosing and patient will be given Librium 25 mg 3 times daily for 3 days this is what he received 6 months ago when discharged from the hospital and he states this work    [KW]      ED Course User Index  [KW] Amy Canela, APRN      .vs  Labs Reviewed - No data to display  Medications   chlordiazePOXIDE (LIBRIUM) capsule 25 mg (has no administration in time range)     No radiology results for the last day                                       MDM  Number of Diagnoses or Management Options  Alcohol dependence in remission (CMS/[facility])  Anxiety  Diagnosis management comments: Patient had above exam and  will be started on Librium after consultation with the pharmacist patient will be given 1 prior to discharge.  He was advised to follow-up and to attend AA classes  He verbalized understood the need to return if worse he we also talked about reaching out for therapy for his anxiety.    Risk of Complications, Morbidity, and/or Mortality  Presenting problems: minimal  Diagnostic procedures: minimal  Management options: minimal    Patient Progress  Patient progress: stable      Final diagnoses:   Anxiety   Alcohol dependence in remission (CMS/HCC)       ED Disposition  ED Disposition     ED Disposition Condition Comment    Discharge Stable           Julian Ruth MD  [address] [facility]  [address]  [place] [address] [phone]    In 3 days  As needed, If symptoms worsen         Medication List      New Prescriptions    chlordiazePOXIDE 25 MG capsule  Commonly known as: LIBRIUM  Take 1 capsule by mouth 3 (Three) Times a Day As Needed for Anxiety or Withdrawal for up to 3 days.           Where to Get Your Medications      These medications were sent to [facility] - [place], [place] - [address] AT [facility] - [phone]  - [phone]   [address] [place] [address]    Phone: [phone]   · chlordiazePOXIDE 25 MG capsule          Amy Canela, APRN  04/20/21 6712

## 2021-04-20 NOTE — DISCHARGE INSTRUCTIONS
Use Librium as prescribed follow-up with primary care as needed    Attend AA classes-to help you stay on track    Return if worse    Seek counseling to help with your anxiety

## 2021-05-28 ENCOUNTER — DOCUMENTATION (OUTPATIENT)
Dept: PHYSICAL THERAPY | Facility: CLINIC | Age: 41
End: 2021-05-28

## 2021-05-28 NOTE — PROGRESS NOTES
Discharge Summary  Discharge Summary from Physical/Occupational Therapy Report    Patient: Sergey Jason   : 1980  Today's Date: 2021    Patient seen for 1 visit  Dates of Service: 21    Comments : pt was evaluated on 21 but has not returned for additional therapy therefore will be DC from our services.       Thank you for this referral to [facility].    SIGNATURE: Winston Grant, PT

## 2021-06-09 ENCOUNTER — HOSPITAL ENCOUNTER (EMERGENCY)
Facility: HOSPITAL | Age: 41
Discharge: HOME | End: 2021-06-09
Attending: EMERGENCY MEDICINE | Admitting: EMERGENCY MEDICINE

## 2021-06-09 ENCOUNTER — APPOINTMENT (OUTPATIENT)
Dept: CT IMAGING | Facility: HOSPITAL | Age: 41
End: 2021-06-09

## 2021-06-09 VITALS
TEMPERATURE: 97.8 F | HEIGHT: 69 IN | HEART RATE: 62 BPM | DIASTOLIC BLOOD PRESSURE: 68 MMHG | BODY MASS INDEX: 25.93 KG/M2 | SYSTOLIC BLOOD PRESSURE: 124 MMHG | WEIGHT: 175.04 LBS | OXYGEN SATURATION: 99 % | RESPIRATION RATE: 18 BRPM

## 2021-06-09 DIAGNOSIS — R10.9 ABDOMINAL PAIN, UNSPECIFIED ABDOMINAL LOCATION: Primary | ICD-10-CM

## 2021-06-09 DIAGNOSIS — R39.15 URINARY URGENCY: ICD-10-CM

## 2021-06-09 LAB
ALBUMIN SERPL-MCNC: 3.9 G/DL (ref 3.5–5.2)
ALBUMIN/GLOB SERPL: 1.7 G/DL
ALP SERPL-CCNC: 76 U/L (ref 39–117)
ALT SERPL W P-5'-P-CCNC: 36 U/L (ref 1–41)
ANION GAP SERPL CALCULATED.3IONS-SCNC: 10 MMOL/L (ref 5–15)
AST SERPL-CCNC: 20 U/L (ref 1–40)
BASOPHILS # BLD AUTO: 0 10*3/MM3 (ref 0–0.2)
BASOPHILS NFR BLD AUTO: 0.6 % (ref 0–1.5)
BILIRUB SERPL-MCNC: 0.2 MG/DL (ref 0–1.2)
BILIRUB UR QL STRIP: NEGATIVE
BUN SERPL-MCNC: 9 MG/DL (ref 6–20)
BUN/CREAT SERPL: 8 (ref 7–25)
CALCIUM SPEC-SCNC: 9 MG/DL (ref 8.6–10.5)
CHLORIDE SERPL-SCNC: 104 MMOL/L (ref 98–107)
CLARITY UR: CLEAR
CO2 SERPL-SCNC: 26 MMOL/L (ref 22–29)
COLOR UR: YELLOW
CREAT SERPL-MCNC: 1.12 MG/DL (ref 0.76–1.27)
DEPRECATED RDW RBC AUTO: 43.3 FL (ref 37–54)
EOSINOPHIL # BLD AUTO: 0.2 10*3/MM3 (ref 0–0.4)
EOSINOPHIL NFR BLD AUTO: 3 % (ref 0.3–6.2)
ERYTHROCYTE [DISTWIDTH] IN BLOOD BY AUTOMATED COUNT: 13.2 % (ref 12.3–15.4)
GFR SERPL CREATININE-BSD FRML MDRD: 73 ML/MIN/1.73
GLOBULIN UR ELPH-MCNC: 2.3 GM/DL
GLUCOSE SERPL-MCNC: 148 MG/DL (ref 65–99)
GLUCOSE UR STRIP-MCNC: NEGATIVE MG/DL
HCT VFR BLD AUTO: 41.3 % (ref 37.5–51)
HGB BLD-MCNC: 13.9 G/DL (ref 13–17.7)
HGB UR QL STRIP.AUTO: NEGATIVE
HOLD SPECIMEN: NORMAL
KETONES UR QL STRIP: NEGATIVE
LEUKOCYTE ESTERASE UR QL STRIP.AUTO: NEGATIVE
LIPASE SERPL-CCNC: 28 U/L (ref 13–60)
LYMPHOCYTES # BLD AUTO: 3.3 10*3/MM3 (ref 0.7–3.1)
LYMPHOCYTES NFR BLD AUTO: 40.5 % (ref 19.6–45.3)
MCH RBC QN AUTO: 32 PG (ref 26.6–33)
MCHC RBC AUTO-ENTMCNC: 33.6 G/DL (ref 31.5–35.7)
MCV RBC AUTO: 95 FL (ref 79–97)
MONOCYTES # BLD AUTO: 0.6 10*3/MM3 (ref 0.1–0.9)
MONOCYTES NFR BLD AUTO: 7.4 % (ref 5–12)
NEUTROPHILS NFR BLD AUTO: 4 10*3/MM3 (ref 1.7–7)
NEUTROPHILS NFR BLD AUTO: 48.5 % (ref 42.7–76)
NITRITE UR QL STRIP: NEGATIVE
NRBC BLD AUTO-RTO: 0.2 /100 WBC (ref 0–0.2)
PH UR STRIP.AUTO: 6 [PH] (ref 5–8)
PLATELET # BLD AUTO: 253 10*3/MM3 (ref 140–450)
PMV BLD AUTO: 7.2 FL (ref 6–12)
POTASSIUM SERPL-SCNC: 3.5 MMOL/L (ref 3.5–5.2)
PROT SERPL-MCNC: 6.2 G/DL (ref 6–8.5)
PROT UR QL STRIP: NEGATIVE
RBC # BLD AUTO: 4.35 10*6/MM3 (ref 4.14–5.8)
SODIUM SERPL-SCNC: 140 MMOL/L (ref 136–145)
SP GR UR STRIP: 1.02 (ref 1–1.03)
UROBILINOGEN UR QL STRIP: NORMAL
WBC # BLD AUTO: 8.2 10*3/MM3 (ref 3.4–10.8)
WHOLE BLOOD HOLD SPECIMEN: NORMAL

## 2021-06-09 PROCEDURE — 96360 HYDRATION IV INFUSION INIT: CPT

## 2021-06-09 PROCEDURE — 74177 CT ABD & PELVIS W/CONTRAST: CPT

## 2021-06-09 PROCEDURE — 80053 COMPREHEN METABOLIC PANEL: CPT | Performed by: EMERGENCY MEDICINE

## 2021-06-09 PROCEDURE — 85025 COMPLETE CBC W/AUTO DIFF WBC: CPT | Performed by: EMERGENCY MEDICINE

## 2021-06-09 PROCEDURE — 81003 URINALYSIS AUTO W/O SCOPE: CPT | Performed by: EMERGENCY MEDICINE

## 2021-06-09 PROCEDURE — 0 IOPAMIDOL PER 1 ML: Performed by: EMERGENCY MEDICINE

## 2021-06-09 PROCEDURE — 99283 EMERGENCY DEPT VISIT LOW MDM: CPT

## 2021-06-09 PROCEDURE — 83690 ASSAY OF LIPASE: CPT | Performed by: EMERGENCY MEDICINE

## 2021-06-09 RX ADMIN — IOPAMIDOL 100 ML: 755 INJECTION, SOLUTION INTRAVENOUS at 02:59

## 2021-06-09 RX ADMIN — LIDOCAINE HYDROCHLORIDE: 20 SOLUTION ORAL; TOPICAL at 02:12

## 2021-06-09 RX ADMIN — SODIUM CHLORIDE 1000 ML: 9 INJECTION, SOLUTION INTRAVENOUS at 02:12

## 2021-06-09 NOTE — ED PROVIDER NOTES
Subjective   40-year-old male with moderate lower/left lower quadrant abdominal pain for the past 3 weeks.  There has been some urinary urgency, otherwise pain is been worse with p.o. intake of food.  She denies any other associated symptoms.  Patient is taking Carafate and omeprazole without improvement.  No blood in stool.          Review of Systems   Gastrointestinal: Positive for abdominal pain.   Genitourinary: Positive for frequency.   All other systems reviewed and are negative.      Past Medical History:   Diagnosis Date   • Kidney stone        Allergies   Allergen Reactions   • Penicillins Unknown (See Comments)     States he is unsure       Past Surgical History:   Procedure Laterality Date   • SHOULDER SURGERY         No family history on file.    Social History     Socioeconomic History   • Marital status:      Spouse name: Not on file   • Number of children: Not on file   • Years of education: Not on file   • Highest education level: Not on file   Tobacco Use   • Smoking status: Current Every Day Smoker     Packs/day: 0.50     Types: Cigarettes   • Smokeless tobacco: Never Used   Substance and Sexual Activity   • Alcohol use: Yes     Comment: occassionally   • Drug use: Yes     Types: Marijuana     Comment: occassionally   • Sexual activity: Defer           Objective   Physical Exam  Constitutional:       Appearance: He is well-developed.   HENT:      Head: Normocephalic and atraumatic.      Mouth/Throat:      Mouth: Mucous membranes are moist.      Pharynx: Oropharynx is clear.   Eyes:      Conjunctiva/sclera: Conjunctivae normal.      Pupils: Pupils are equal, round, and reactive to light.   Cardiovascular:      Rate and Rhythm: Normal rate and regular rhythm.      Heart sounds: Normal heart sounds.   Pulmonary:      Effort: Pulmonary effort is normal.      Breath sounds: Normal breath sounds.   Abdominal:      General: Bowel sounds are normal. There is no distension.      Palpations: Abdomen  is soft.      Comments: Mild left lower quadrant tenderness, no rebound or guarding   Musculoskeletal:         General: No swelling or tenderness. Normal range of motion.      Cervical back: Normal range of motion and neck supple.   Skin:     General: Skin is warm and dry.      Capillary Refill: Capillary refill takes less than 2 seconds.   Neurological:      General: No focal deficit present.      Mental Status: He is alert and oriented to person, place, and time.   Psychiatric:         Mood and Affect: Mood normal.         Behavior: Behavior normal.         Procedures           ED Course                                           MDM  Number of Diagnoses or Management Options  Abdominal pain, unspecified abdominal location  Urinary urgency  Diagnosis management comments: Pain better with gi cocktail.  Patient also has nocturia and frequency, suspect bph, patient has flomax at home, urology referral    Patient with continue PPI and will refer to GI.    Results for orders placed or performed during the hospital encounter of 06/09/21  -Comprehensive Metabolic Panel  Specimen: Blood       Result                      Value             Ref Range           Glucose                     148 (H)           65 - 99 mg/dL       BUN                         9                 6 - 20 mg/dL        Creatinine                  1.12              0.76 - 1.27 *       Sodium                      140               136 - 145 mm*       Potassium                   3.5               3.5 - 5.2 mm*       Chloride                    104               98 - 107 mmo*       CO2                         26.0              22.0 - 29.0 *       Calcium                     9.0               8.6 - 10.5 m*       Total Protein               6.2               6.0 - 8.5 g/*       Albumin                     3.90              3.50 - 5.20 *       ALT (SGPT)                  36                1 - 41 U/L          AST (SGOT)                  20                1 - 40  U/L          Alkaline Phosphatase        76                39 - 117 U/L        Total Bilirubin             0.2               0.0 - 1.2 mg*       eGFR Non  Amer       73                >60 mL/min/1*       Globulin                    2.3               gm/dL               A/G Ratio                   1.7               g/dL                BUN/Creatinine Ratio        8.0               7.0 - 25.0          Anion Gap                   10.0              5.0 - 15.0 m*  -Lipase  Specimen: Blood       Result                      Value             Ref Range           Lipase                      28                13 - 60 U/L    -Urinalysis With Culture If Indicated - Urine, Clean Catch  Specimen: Urine, Clean Catch       Result                      Value             Ref Range           Color, UA                   Yellow            Yellow, Straw       Appearance, UA              Clear             Clear               pH, UA                      6.0               5.0 - 8.0           Specific Gravity, UA        1.020             1.005 - 1.030       Glucose, UA                 Negative          Negative            Ketones, UA                 Negative          Negative            Bilirubin, UA               Negative          Negative            Blood, UA                   Negative          Negative            Protein, UA                 Negative          Negative            Leuk Esterase, UA           Negative          Negative            Nitrite, UA                 Negative          Negative            Urobilinogen, UA            1.0 E.U./dL       0.2 - 1.0 E.*  -CBC Auto Differential  Specimen: Blood       Result                      Value             Ref Range           WBC                         8.20              3.40 - 10.80*       RBC                         4.35              4.14 - 5.80 *       Hemoglobin                  13.9              13.0 - 17.7 *       Hematocrit                  41.3              37.5 - 51.0 %        MCV                         95.0              79.0 - 97.0 *       MCH                         32.0              26.6 - 33.0 *       MCHC                        33.6              31.5 - 35.7 *       RDW                         13.2              12.3 - 15.4 %       RDW-SD                      43.3              37.0 - 54.0 *       MPV                         7.2               6.0 - 12.0 fL       Platelets                   253               140 - 450 10*       Neutrophil %                48.5              42.7 - 76.0 %       Lymphocyte %                40.5              19.6 - 45.3 %       Monocyte %                  7.4               5.0 - 12.0 %        Eosinophil %                3.0               0.3 - 6.2 %         Basophil %                  0.6               0.0 - 1.5 %         Neutrophils, Absolute       4.00              1.70 - 7.00 *       Lymphocytes, Absolute       3.30 (H)          0.70 - 3.10 *       Monocytes, Absolute         0.60              0.10 - 0.90 *       Eosinophils, Absolute       0.20              0.00 - 0.40 *       Basophils, Absolute         0.00              0.00 - 0.20 *       nRBC                        0.2               0.0 - 0.2 /1*  -Light Blue Top       Result                      Value             Ref Range           Extra Tube                                                    hold for add-on  -Gold Top - SST       Result                      Value             Ref Range           Extra Tube                                                    Hold for add-ons.  CT Abdomen Pelvis With Contrast   Final Result            1. No acute process seen within the abdomen or pelvis.    2. Diverticulosis without evidence of diverticulitis.                    Electronically signed by:  Juvencio Frye D.O.      6/9/2021 1:12 AM            Amount and/or Complexity of Data Reviewed  Clinical lab tests: ordered and reviewed  Tests in the radiology section of CPT®: ordered and reviewed  Tests in  the medicine section of CPT®: reviewed and ordered        Final diagnoses:   Abdominal pain, unspecified abdominal location   Urinary urgency       ED Disposition  ED Disposition     ED Disposition Condition Comment    Discharge Stable           [facility]  [address] [address]  [phone]  Schedule an appointment as soon as possible for a visit       Charbel Ray MD  [address] [facility] [address]  [phone]    Schedule an appointment as soon as possible for a visit            Medication List      No changes were made to your prescriptions during this visit.          Julian Triplett MD  06/09/21 0350

## 2021-06-21 ENCOUNTER — TELEPHONE (OUTPATIENT)
Dept: ORTHOPEDIC SURGERY | Facility: CLINIC | Age: 41
End: 2021-06-21

## 2021-06-21 DIAGNOSIS — M77.01 MEDIAL EPICONDYLITIS, RIGHT: Primary | ICD-10-CM

## 2021-06-21 NOTE — TELEPHONE ENCOUNTER
Provider: DR SLAUGHTER    Caller: FIORDALIZA SPANGLER    Relationship to Patient: SELF    Phone Number: [phone]    Reason for Call: PT IS SCHEDULED FOR CORTISONE INJECTION 7/19, WANTS TO DO PHYSICAL THERAPY BETWEEN NOW AND THEN AND NEEDS ORDER. DOESN'T HAVE PREFERENCE OF LOCATION, MENTIONED PHYSICAL THERAPY NEXT DOOR TO DR SLAUGHTER' OFFICE.

## 2021-06-22 NOTE — TELEPHONE ENCOUNTER
Order was placed. I called PT and it went straight to VM, I left a message about therapy calling him to get scheduled and to call the office if he doesn't hear anything or has any questions.

## 2021-07-19 ENCOUNTER — OFFICE VISIT (OUTPATIENT)
Dept: ORTHOPEDIC SURGERY | Facility: CLINIC | Age: 41
End: 2021-07-19

## 2021-07-19 VITALS
SYSTOLIC BLOOD PRESSURE: 126 MMHG | HEIGHT: 69 IN | HEART RATE: 69 BPM | DIASTOLIC BLOOD PRESSURE: 84 MMHG | BODY MASS INDEX: 26.51 KG/M2 | WEIGHT: 179 LBS

## 2021-07-19 DIAGNOSIS — M77.11 LATERAL EPICONDYLITIS, RIGHT ELBOW: Primary | ICD-10-CM

## 2021-07-19 PROCEDURE — 99213 OFFICE O/P EST LOW 20 MIN: CPT | Performed by: FAMILY MEDICINE

## 2021-07-19 PROCEDURE — 20550 NJX 1 TENDON SHEATH/LIGAMENT: CPT | Performed by: FAMILY MEDICINE

## 2021-07-19 RX ORDER — DICLOFENAC SODIUM 75 MG/1
75 TABLET, DELAYED RELEASE ORAL 2 TIMES DAILY
Qty: 60 TABLET | Refills: 3 | Status: SHIPPED | OUTPATIENT
Start: 2021-07-19 | End: 2021-11-16

## 2021-07-19 RX ORDER — OMEPRAZOLE 40 MG/1
CAPSULE, DELAYED RELEASE ORAL
COMMUNITY
Start: 2021-07-13 | End: 2022-08-31

## 2021-07-19 NOTE — PROGRESS NOTES
"Primary Care Sports Medicine Office Visit Note     Patient ID: Sergey Jason is a 41 y.o. male.    Chief Complaint:  Chief Complaint   Patient presents with   • Right Elbow - Follow-up, Pain     HPI:    Mr. Sergey Jason is a 41 y.o. male who returns to the clinic today for follow up evaluation of continued lateral epicondylitis.  The patient was seen near 3 months ago, and had lateral epicondyle injection at that time.  He states that it worked phenomenally well, and he had no problems or issues.  Was able to continue working without complaint.  Unfortunately feels a week or 2 ago this injection wore off.  He requests repeat injection today.    Past Medical History:   Diagnosis Date   • Kidney stone        Past Surgical History:   Procedure Laterality Date   • SHOULDER SURGERY         No family history on file.  Social History     Occupational History   • Not on file   Tobacco Use   • Smoking status: Current Every Day Smoker     Packs/day: 0.50     Types: Cigarettes   • Smokeless tobacco: Never Used   Substance and Sexual Activity   • Alcohol use: Yes     Comment: occassionally   • Drug use: Yes     Types: Marijuana     Comment: occassionally   • Sexual activity: Defer      Review of Systems   Constitutional: Negative for activity change, fatigue and fever.   Musculoskeletal: Positive for arthralgias.   Skin: Negative for color change and rash.   Neurological: Negative for numbness.       Objective:    /84   Pulse 69   Ht 175.3 cm (69\")   Wt 81.2 kg (179 lb)   BMI 26.43 kg/m²     Physical Examination:  Physical Exam  Vitals and nursing note reviewed.   Constitutional:       General: He is not in acute distress.     Appearance: He is well-developed. He is not diaphoretic.   HENT:      Head: Normocephalic and atraumatic.   Eyes:      Conjunctiva/sclera: Conjunctivae normal.   Pulmonary:      Effort: Pulmonary effort is normal. No respiratory distress.   Skin:     General: Skin is warm.      Capillary " "Refill: Capillary refill takes less than 2 seconds.   Neurological:      Mental Status: He is alert.       Right Elbow Exam     Tenderness   The patient is experiencing tenderness in the lateral epicondyle.     Range of Motion   Extension: normal   Flexion: normal   Pronation: normal   Supination: normal     Muscle Strength   Pronation:  5/5   Supination:  5/5     Other   Erythema: absent  Sensation: normal  Pulse: present    Comments:  Pain with resisted middle finger extension, pain with resisted supination        Imaging and other tests:  No new imaging today.    Assessment and Plan:    1.  Lateral epicondylitis, right  - diclofenac (VOLTAREN) 75 MG EC tablet; Take 1 tablet by mouth 2 (Two) Times a Day. Prn joint pain  Dispense: 60 tablet; Refill: 3    After discussion of risks and benefits, the patient elected to proceed with corticosteroid injection to the right lateral epicondyle.  The patient tolerated this procedure well without any complaints or problems.  I recommended continuation of conservative management as previous with strap brace, and diclofenac anti-inflammatory.  RTC in 3-6 months or sooner if symptoms recur.      Angel DORADO \"Chance\" Yaw FOREMAN DO, CAQSM  07/21/21  17:54 EDT    Disclaimer: Please note that areas of this note were completed with computer voice recognition software.  Quite often unanticipated grammatical, syntax, homophones, and other interpretive errors are inadvertently transcribed by the computer software. Please excuse any errors that have escaped final proofreading.  "

## 2021-07-21 RX ORDER — TRIAMCINOLONE ACETONIDE 40 MG/ML
40 INJECTION, SUSPENSION INTRA-ARTICULAR; INTRAMUSCULAR
Status: COMPLETED | OUTPATIENT
Start: 2021-07-21 | End: 2021-07-21

## 2021-07-21 RX ADMIN — TRIAMCINOLONE ACETONIDE 40 MG: 40 INJECTION, SUSPENSION INTRA-ARTICULAR; INTRAMUSCULAR at 17:54

## 2021-07-21 NOTE — PROGRESS NOTES
Procedure   Injection Tendon or Ligament    Date/Time: 7/21/2021 5:54 PM  Performed by: Angel Tidwell II, DO  Authorized by: Angel Tidwell II, DO   Preparation: Patient was prepped and draped in the usual sterile fashion.  Local anesthesia used: no    Anesthesia:  Local anesthesia used: no    Sedation:  Patient sedated: no    Patient tolerance: patient tolerated the procedure well with no immediate complications  Comments: A right lateral epicondyle ECRB tendon injection was performed today.  After risks and benefits were discussed, and patient was identified, the right lateral elbow was prepped and draped in usual fashion.  Alcohol and Betadine was used to cleanse the skin.  Ethyl chloride spray was used for skin anesthesia. A 22-gauge 1/2 inch needle was then used to infiltrate the Tendinous insertion.  1cc of 1% lidocaine without epinephrine and 1cc of Kenalog 40 mg was then injected into the insertion.  The needle was then removed without complication.  Blood loss negligible.  Patient admits to near immediate pain relief with gentle range of motion post injection.     Medications administered: 40 mg triamcinolone acetonide 40 MG/ML

## 2021-11-16 ENCOUNTER — OFFICE VISIT (OUTPATIENT)
Dept: ORTHOPEDIC SURGERY | Facility: CLINIC | Age: 41
End: 2021-11-16

## 2021-11-16 VITALS — OXYGEN SATURATION: 97 % | RESPIRATION RATE: 16 BRPM | HEIGHT: 69 IN | BODY MASS INDEX: 26.66 KG/M2 | WEIGHT: 180 LBS

## 2021-11-16 DIAGNOSIS — M77.01 MEDIAL EPICONDYLITIS OF RIGHT ELBOW: Primary | ICD-10-CM

## 2021-11-16 PROCEDURE — 20550 NJX 1 TENDON SHEATH/LIGAMENT: CPT | Performed by: FAMILY MEDICINE

## 2021-11-16 RX ORDER — TRIAMCINOLONE ACETONIDE 40 MG/ML
40 INJECTION, SUSPENSION INTRA-ARTICULAR; INTRAMUSCULAR
Status: COMPLETED | OUTPATIENT
Start: 2021-11-16 | End: 2021-11-16

## 2021-11-16 RX ORDER — SERTRALINE HYDROCHLORIDE 25 MG/1
TABLET, FILM COATED ORAL
COMMUNITY
Start: 2021-08-30 | End: 2022-02-16

## 2021-11-16 RX ORDER — SUCRALFATE 1 G/1
TABLET ORAL
COMMUNITY
Start: 2021-08-30 | End: 2022-02-16

## 2021-11-16 RX ORDER — TAMSULOSIN HYDROCHLORIDE 0.4 MG/1
CAPSULE ORAL
COMMUNITY
Start: 2021-10-03 | End: 2022-02-16

## 2021-11-16 RX ADMIN — TRIAMCINOLONE ACETONIDE 40 MG: 40 INJECTION, SUSPENSION INTRA-ARTICULAR; INTRAMUSCULAR at 22:15

## 2021-11-16 NOTE — PROGRESS NOTES
"Primary Care Sports Medicine Office Visit Note     Patient ID: Sergey Jason is a 41 y.o. male.    Chief Complaint:  Chief Complaint   Patient presents with   • Right Elbow - Follow-up     HPI:    Mr. Sergey Jason is a 41 y.o. male who returns to the clinic today for follow-up evaluation of right elbow pain.  The patient states medial condyle continues to bother him, and he has had considerable amount of pain and problems over the last 1 to 2 weeks, return.  Previous corticosteroid injection helped significantly, but unfortunately pain is returned similar in location and severity as previous.    Past Medical History:   Diagnosis Date   • Kidney stone        Past Surgical History:   Procedure Laterality Date   • SHOULDER SURGERY         No family history on file.  Social History     Occupational History   • Not on file   Tobacco Use   • Smoking status: Current Every Day Smoker     Packs/day: 0.50     Types: Cigarettes   • Smokeless tobacco: Never Used   Substance and Sexual Activity   • Alcohol use: Yes     Comment: occassionally   • Drug use: Yes     Types: Marijuana     Comment: occassionally   • Sexual activity: Defer      Review of Systems   Constitutional: Negative for activity change, fatigue and fever.   Musculoskeletal: Positive for arthralgias.   Skin: Negative for color change and rash.   Neurological: Negative for numbness.     Objective:    Resp 16   Ht 175.3 cm (69\")   Wt 81.6 kg (180 lb)   SpO2 97%   BMI 26.58 kg/m²     Physical Examination:  Physical Exam  Vitals and nursing note reviewed.   Constitutional:       General: He is not in acute distress.     Appearance: He is well-developed. He is not diaphoretic.   HENT:      Head: Normocephalic and atraumatic.   Eyes:      Conjunctiva/sclera: Conjunctivae normal.   Pulmonary:      Effort: Pulmonary effort is normal. No respiratory distress.   Skin:     General: Skin is warm.      Capillary Refill: Capillary refill takes less than 2 seconds. " "  Neurological:      Mental Status: He is alert.       Right Elbow Exam     Tenderness   The patient is experiencing tenderness in the medial epicondyle.     Range of Motion   Extension: normal   Flexion: normal   Pronation: normal   Supination: normal     Muscle Strength   Pronation:  5/5   Supination:  5/5     Tests   Tinel's sign (cubital tunnel): negative    Other   Erythema: absent  Sensation: normal  Pulse: present    Comments:  Pain specifically to medial epicondyle, with resisted pronation of the wrist        Imaging and other tests:  No new imaging today.    Assessment and Plan:    1. Medial epicondylitis of right elbow    Today we discussed pathology and treatment options again.  I recommend the patient does not attempt any further corticosteroid injection, as this can cause tendinous rupture.  After discussing this, we performed his last corticosteroid injection today.  He tolerated this procedure well without any complaints or problems.  Please see procedure note.  RTC in 3 months, and we could consider PRP at that time if warranted.  Otherwise activity modification, bracing, anti-inflammatory as previously discussed.  He was also given prescription for topical compounded pain cream from [facility] today as well.    Angel DORADO \"Chance\" Yaw FOREMAN DO, CAQSM  11/16/21  22:15 EST    Disclaimer: Please note that areas of this note were completed with computer voice recognition software.  Quite often unanticipated grammatical, syntax, homophones, and other interpretive errors are inadvertently transcribed by the computer software. Please excuse any errors that have escaped final proofreading.  "

## 2021-11-17 NOTE — PROGRESS NOTES
Procedure   Injection Tendon or Ligament    Date/Time: 11/16/2021 10:15 PM  Performed by: Angel Tidwell II, DO  Authorized by: Angel Tidwell II, DO   Preparation: Patient was prepped and draped in the usual sterile fashion.  Local anesthesia used: no    Anesthesia:  Local anesthesia used: no    Sedation:  Patient sedated: no    Patient tolerance: patient tolerated the procedure well with no immediate complications  Comments: A right medial epicondyle forearm flexor tendon injection was performed today.  After risks and benefits were discussed, and patient was identified, the right medial elbow was prepped and draped in usual fashion.  Alcohol and Betadine was used to cleanse the skin.  Ethyl chloride spray was used for skin anesthesia. A 22-gauge 1/2 inch needle was then used to infiltrate the Tendinous insertion.  1cc of 1% lidocaine without epinephrine and 1cc of Kenalog 40 mg was then injected into the insertion.  The needle was then removed without complication.  Blood loss negligible.  Patient admits to near immediate pain relief with gentle range of motion post injection.     Medications administered: 40 mg triamcinolone acetonide 40 MG/ML

## 2022-01-31 ENCOUNTER — TELEPHONE (OUTPATIENT)
Dept: ORTHOPEDIC SURGERY | Facility: CLINIC | Age: 42
End: 2022-01-31

## 2022-02-16 ENCOUNTER — OFFICE VISIT (OUTPATIENT)
Dept: ORTHOPEDIC SURGERY | Facility: CLINIC | Age: 42
End: 2022-02-16

## 2022-02-16 VITALS — WEIGHT: 170 LBS | RESPIRATION RATE: 18 BRPM | HEIGHT: 69 IN | BODY MASS INDEX: 25.18 KG/M2

## 2022-02-16 DIAGNOSIS — M77.01 MEDIAL EPICONDYLITIS OF ELBOW, RIGHT: ICD-10-CM

## 2022-02-16 DIAGNOSIS — M25.521 RIGHT ELBOW PAIN: Primary | ICD-10-CM

## 2022-02-16 PROCEDURE — 99214 OFFICE O/P EST MOD 30 MIN: CPT | Performed by: FAMILY MEDICINE

## 2022-02-16 PROCEDURE — 0232T NJX PLATELET PLASMA: CPT | Performed by: FAMILY MEDICINE

## 2022-02-16 RX ORDER — TIZANIDINE 4 MG/1
4 TABLET ORAL NIGHTLY PRN
Qty: 7 TABLET | Refills: 0 | Status: SHIPPED | OUTPATIENT
Start: 2022-02-16 | End: 2022-07-27

## 2022-02-16 NOTE — PROGRESS NOTES
"Primary Care Sports Medicine Office Visit Note     Patient ID: Sergey Jason is a 41 y.o. male.    Chief Complaint:  Chief Complaint   Patient presents with   • Right Elbow - Pain     HPI:    Mr. Sergey Jason is a 41 y.o. male who returns to the clinic today for follow-up evaluation of right medial epicondylitis.  He previously was seen and evaluated, has been physical therapy, and afforded multiple corticosteroid injections to both the lateral and medial epicondyles of this elbow.  Unfortunately medial epicondyle continues to be considerably painful.  Patient has educated himself, and would like to proceed with PRP to the medial epicondyle insertional tendinopathy today.    Past Medical History:   Diagnosis Date   • Kidney stone        Past Surgical History:   Procedure Laterality Date   • SHOULDER SURGERY         No family history on file.  Social History     Occupational History   • Not on file   Tobacco Use   • Smoking status: Current Every Day Smoker     Packs/day: 0.50     Types: Cigarettes   • Smokeless tobacco: Never Used   Substance and Sexual Activity   • Alcohol use: Yes     Comment: occassionally   • Drug use: Yes     Types: Marijuana     Comment: occassionally   • Sexual activity: Defer      Review of Systems   Constitutional: Negative for activity change, fatigue and fever.   Musculoskeletal: Positive for arthralgias and myalgias.   Skin: Negative for color change and rash.   Neurological: Negative for numbness.     Objective:    Resp 18   Ht 175.3 cm (69\")   Wt 77.1 kg (170 lb)   BMI 25.10 kg/m²     Physical Examination:  Physical Exam  Vitals and nursing note reviewed.   Constitutional:       General: He is not in acute distress.     Appearance: He is well-developed. He is not diaphoretic.   HENT:      Head: Normocephalic and atraumatic.   Eyes:      Conjunctiva/sclera: Conjunctivae normal.   Pulmonary:      Effort: Pulmonary effort is normal. No respiratory distress.   Skin:     General: Skin " "is warm.      Capillary Refill: Capillary refill takes less than 2 seconds.   Neurological:      Mental Status: He is alert.       Right Elbow Exam     Tenderness   The patient is experiencing tenderness in the medial epicondyle.     Range of Motion   Extension: normal   Flexion: normal   Pronation: normal   Supination: normal     Muscle Strength   Pronation:  5/5   Supination:  5/5     Tests   Tinel's sign (cubital tunnel): negative    Other   Erythema: absent  Sensation: normal  Pulse: present        Imaging and other tests:  Please see ultrasound findings on procedure note of today's date.    Assessment and Plan:    1. Right elbow pain  - tiZANidine (ZANAFLEX) 4 MG tablet; Take 1 tablet by mouth At Night As Needed for Muscle Spasms.  Dispense: 7 tablet; Refill: 0    2. Medial epicondylitis of elbow, right    After discussion of risks and benefits, the patient elected to proceed with autologous PRP injection of the medial epicondyle under ultrasound guidance.  The patient tolerated this procedure well without any complaints or problems.  I recommended continuation of conservative management as previous.  We again discussed no anti-inflammatories until follow-up, RTC in 4 weeks for repeat ultrasound evaluation.    Angel DORADO \"Chance\" Yaw FOREMAN DO, CAQSM  02/22/22  21:03 EST    Disclaimer: Please note that areas of this note were completed with computer voice recognition software.  Quite often unanticipated grammatical, syntax, homophones, and other interpretive errors are inadvertently transcribed by the computer software. Please excuse any errors that have escaped final proofreading.  "

## 2022-02-23 NOTE — PROGRESS NOTES
"Procedure   Injection Tendon or Ligament    Date/Time: 2/15/2022 9:04 PM  Performed by: Angel Tidwell II, DO  Authorized by: Angel Tidwell II, DO   Preparation: Patient was prepped and draped in the usual sterile fashion.  Local anesthesia used: no    Anesthesia:  Local anesthesia used: no    Sedation:  Patient sedated: no    Patient tolerance: patient tolerated the procedure well with no immediate complications  Comments: After discussing risk and benefits, the patient was identified, and the right medial elbow was prepped and draped in usual fashion.  Alcohol and Betadine was used to cleanse the skin.  Ethyl chloride was used for skin anesthesia, and ultrasound was used to visualize the insertion of the flexor musculature about the conjoined tenderness insertion of the medial epicondyle.  Under direct visualization the 22-gauge needle was used to infiltrate the area of maximal architectural disruption, small insertional tear of the tendon origin.  3 cc of PRP was then injected under visualization.  Images were saved in the ultrasound machine.  The needle was then removed.  Blood loss negligible.  A bandage was placed.  The patient tolerated procedure incredibly well and had no immediate complications or problems.  RTC in 1 month. Again, discuss no NSAIDs for the next 4-6 weeks.     Angel DORADO \"Chance\" Yaw FOREMAN DO, CAQSM  02/22/22  21:05 EST               "

## 2022-03-16 ENCOUNTER — OFFICE VISIT (OUTPATIENT)
Dept: ORTHOPEDIC SURGERY | Facility: CLINIC | Age: 42
End: 2022-03-16

## 2022-03-16 VITALS — HEIGHT: 69 IN | WEIGHT: 170 LBS | BODY MASS INDEX: 25.18 KG/M2 | RESPIRATION RATE: 18 BRPM

## 2022-03-16 DIAGNOSIS — M77.12 LATERAL EPICONDYLITIS OF LEFT ELBOW: Primary | ICD-10-CM

## 2022-03-16 PROCEDURE — 99213 OFFICE O/P EST LOW 20 MIN: CPT | Performed by: FAMILY MEDICINE

## 2022-03-16 NOTE — PROGRESS NOTES
"Primary Care Sports Medicine Office Visit Note     Patient ID: Sergey Jason is a 41 y.o. male.    Chief Complaint:  Chief Complaint   Patient presents with   • Right Elbow - Follow-up     PRP injection 2/16/22     HPI:    Mr. Sergey Jason is a 41 y.o. male who presents to the clinic today for follow-up evaluation of PRP injection to the right lateral epicondyle.  Patient states he is doing well, but still has a moderate amount of pain laterally.    Past Medical History:   Diagnosis Date   • Kidney stone        Past Surgical History:   Procedure Laterality Date   • SHOULDER SURGERY         No family history on file.  Social History     Occupational History   • Not on file   Tobacco Use   • Smoking status: Current Every Day Smoker     Packs/day: 0.50     Types: Cigarettes   • Smokeless tobacco: Never Used   Substance and Sexual Activity   • Alcohol use: Yes     Comment: occassionally   • Drug use: Yes     Types: Marijuana     Comment: occassionally   • Sexual activity: Defer      Review of Systems   Constitutional: Negative for activity change, fatigue and fever.   Musculoskeletal: Positive for arthralgias.   Skin: Negative for color change and rash.   Neurological: Negative for numbness.     Objective:    Resp 18   Ht 175.3 cm (69\")   Wt 77.1 kg (170 lb)   BMI 25.10 kg/m²     Physical Examination:  Physical Exam  Vitals and nursing note reviewed.   Constitutional:       General: He is not in acute distress.     Appearance: He is well-developed. He is not diaphoretic.   HENT:      Head: Normocephalic and atraumatic.   Eyes:      Conjunctiva/sclera: Conjunctivae normal.   Pulmonary:      Effort: Pulmonary effort is normal. No respiratory distress.   Skin:     General: Skin is warm.      Capillary Refill: Capillary refill takes less than 2 seconds.   Neurological:      Mental Status: He is alert.       Right Elbow Exam     Tenderness   The patient is experiencing tenderness in the lateral epicondyle.     Range of " "Motion   Extension: normal   Flexion: normal   Pronation: normal   Supination: normal     Muscle Strength   Pronation:  5/5   Supination:  5/5     Other   Erythema: absent  Sensation: normal  Pulse: present        Imaging and other tests:  No new imaging today.    Assessment and Plan:    1. Lateral epicondylitis of left elbow    Medial epicondylar pain status post PRP is near completely resolved today, unfortunately continues to have lateral epicondyle pain, that slowly seems to be improving as well.  Greater than 4 weeks status post PRP, okay to return to anti-inflammatory as needed.  RTC as needed.    Angel DORADO \"Chance\" Yaw FOREMAN DO, CAQSM  03/16/22  11:54 EDT    Disclaimer: Please note that areas of this note were completed with computer voice recognition software.  Quite often unanticipated grammatical, syntax, homophones, and other interpretive errors are inadvertently transcribed by the computer software. Please excuse any errors that have escaped final proofreading.  "

## 2022-07-27 ENCOUNTER — OFFICE VISIT (OUTPATIENT)
Dept: ORTHOPEDIC SURGERY | Facility: CLINIC | Age: 42
End: 2022-07-27

## 2022-07-27 VITALS — OXYGEN SATURATION: 97 % | HEART RATE: 87 BPM | WEIGHT: 170 LBS | BODY MASS INDEX: 25.18 KG/M2 | HEIGHT: 69 IN

## 2022-07-27 DIAGNOSIS — M62.830 LUMBAR PARASPINAL MUSCLE SPASM: Primary | ICD-10-CM

## 2022-07-27 PROCEDURE — 99214 OFFICE O/P EST MOD 30 MIN: CPT | Performed by: FAMILY MEDICINE

## 2022-07-27 RX ORDER — CHLORZOXAZONE 500 MG/1
500 TABLET ORAL NIGHTLY PRN
Qty: 7 TABLET | Refills: 0 | Status: SHIPPED | OUTPATIENT
Start: 2022-07-27 | End: 2022-08-31

## 2022-07-27 RX ORDER — TRAZODONE HYDROCHLORIDE 150 MG/1
200 TABLET ORAL NIGHTLY
COMMUNITY

## 2022-07-27 RX ORDER — METHYLPREDNISOLONE 4 MG/1
TABLET ORAL
Qty: 21 TABLET | Refills: 0 | Status: SHIPPED | OUTPATIENT
Start: 2022-07-27 | End: 2022-08-31

## 2022-07-27 RX ORDER — MELOXICAM 15 MG/1
15 TABLET ORAL DAILY PRN
Qty: 30 TABLET | Refills: 0 | Status: SHIPPED | OUTPATIENT
Start: 2022-07-27 | End: 2022-08-31

## 2022-07-27 NOTE — PROGRESS NOTES
"Primary Care Sports Medicine Office Visit Note     Patient ID: Sergey Jason is a 42 y.o. male.    Chief Complaint:  Chief Complaint   Patient presents with   • Right Elbow - Follow-up, Pain   • Lumbar Spine - Pain     HPI:    Mr. Sergey Jason is a 42 y.o. male. The patient presents to the clinic today for low back pain evaluation.    The patient states that he can not give up working out at the gym. He states that he is in the best shape he has been in for years. The patient reports that he was told he has a pars defect. He denies numbness or tingling. He denies uneven leg strength. The patient states he was seen at an intermediate care facility where they did x-rays. He reports they recommended pain management, and told him it would heal on its own.    Past Medical History:   Diagnosis Date   • Kidney stone        Past Surgical History:   Procedure Laterality Date   • SHOULDER SURGERY         History reviewed. No pertinent family history.  Social History     Occupational History   • Not on file   Tobacco Use   • Smoking status: Current Every Day Smoker     Packs/day: 0.50     Types: Cigarettes   • Smokeless tobacco: Never Used   Vaping Use   • Vaping Use: Never used   Substance and Sexual Activity   • Alcohol use: Yes     Comment: occassionally   • Drug use: Yes     Types: Marijuana     Comment: occassionally   • Sexual activity: Defer      Review of Systems   Constitutional: Negative for activity change, fatigue and fever.   Musculoskeletal: Positive for arthralgias and back pain.   Skin: Negative for color change and rash.   Neurological: Negative for numbness.     Objective:    Pulse 87   Ht 175.3 cm (69\")   Wt 77.1 kg (170 lb)   SpO2 97%   BMI 25.10 kg/m²     Physical Examination:  Physical Exam  Vitals and nursing note reviewed.   Constitutional:       General: He is not in acute distress.     Appearance: He is well-developed. He is not diaphoretic.   HENT:      Head: Normocephalic and atraumatic. "   Eyes:      Conjunctiva/sclera: Conjunctivae normal.   Pulmonary:      Effort: Pulmonary effort is normal. No respiratory distress.   Skin:     General: Skin is warm.      Capillary Refill: Capillary refill takes less than 2 seconds.   Neurological:      Mental Status: He is alert.       Back Exam     Comments:  Lumbar spine exam reveals full range of motion, flexion, extension, side bending, and rotation. The strength of large muscle groups of bilateral lower extremities intact, 5/5. Light touch sensation is intact to bilateral lower extremities as well. Deep tendon reflexes, patellar tendons are brisk bilaterally.        Imaging and other tests:    CT abdomen and pelvis dated 05/22/2022 from outside facility emergency department yields no acute intra-abdominal process. There is, however, noted on this imaging study, bilateral pars defects of L5 with anterolisthesis of L5 on S1 measured 5 mm.     Assessment and Plan:    1. Lumbar paraspinal muscle spasm  - methylPREDNISolone (MEDROL) 4 MG dose pack; Use as directed by package instructions  Dispense: 21 tablet; Refill: 0  - meloxicam (MOBIC) 15 MG tablet; Take 1 tablet by mouth Daily As Needed for Mild Pain .  Dispense: 30 tablet; Refill: 0  - chlorzoxazone (PARAFON FORTE) 500 MG tablet; Take 1 tablet by mouth At Night As Needed for Muscle Spasms.  Dispense: 7 tablet; Refill: 0    ASSESSMENT:   1. Bilateral pars defects of L5   2. Anterolisthesis of the lumbar spine  3. Lumbar paraspinal muscle spasm     PLAN:  I discussed pathology and treatment options. Bilateral pars defect appears chronic on most recent CT. Will start treatment for lumbar paraspinal muscle spasm. We discussed Medrol dosepak followed by meloxicam for anti-inflammatory benefit and once nightly muscle relaxer. I recommend holding his nightly trazodone for sleep if he is going to use the muscle relaxer at night. Otherwise, return to clinic in 1 month if no improvement.    Transcribed from ambient  dictation for Angel Tidwell II, DO by Radha Doyle.  07/27/22   09:25 EDT    Patient verbalized consent to the visit recording.    Disclaimer: Please note that areas of this note were completed with computer voice recognition software.  Quite often unanticipated grammatical, syntax, homophones, and other interpretive errors are inadvertently transcribed by the computer software. Please excuse any errors that have escaped final proofreading.

## 2022-08-29 ENCOUNTER — TELEPHONE (OUTPATIENT)
Dept: ORTHOPEDIC SURGERY | Facility: CLINIC | Age: 42
End: 2022-08-29

## 2022-08-29 NOTE — TELEPHONE ENCOUNTER
"  Caller: FIORDALIZA SPANGLER    Relationship to patient: SELF    Best call back number:  [phone]    Chief complaint:  PATIENT SEEING DR. SLAUGHTER FOR RIGHT ELBOW. PATIENT SAYS HE WENT GOLFING YESTERDAY AND \"KNOWS HE HAS RE-TORE HIS ELBOW\". PAIN IS BAD, SOME SWELLING. ASKING FOR APPT. ASAP. ? PRP INJECTION AGAIN?    Type of visit: F/U    Requested date: ASAP      "

## 2022-08-31 ENCOUNTER — OFFICE VISIT (OUTPATIENT)
Dept: ORTHOPEDIC SURGERY | Facility: CLINIC | Age: 42
End: 2022-08-31

## 2022-08-31 VITALS — HEIGHT: 69 IN | WEIGHT: 162.8 LBS | HEART RATE: 74 BPM | BODY MASS INDEX: 24.11 KG/M2

## 2022-08-31 DIAGNOSIS — M77.01 MEDIAL EPICONDYLITIS, RIGHT ELBOW: Primary | ICD-10-CM

## 2022-08-31 PROCEDURE — 20551 NJX 1 TENDON ORIGIN/INSJ: CPT | Performed by: FAMILY MEDICINE

## 2022-08-31 RX ADMIN — METHYLPREDNISOLONE ACETATE 80 MG: 80 INJECTION, SUSPENSION INTRA-ARTICULAR; INTRALESIONAL; INTRAMUSCULAR; SOFT TISSUE at 17:47

## 2022-09-01 RX ORDER — METHYLPREDNISOLONE ACETATE 80 MG/ML
80 INJECTION, SUSPENSION INTRA-ARTICULAR; INTRALESIONAL; INTRAMUSCULAR; SOFT TISSUE
Status: COMPLETED | OUTPATIENT
Start: 2022-08-31 | End: 2022-08-31

## 2022-09-01 NOTE — PROGRESS NOTES
Procedure   - Injection Tendon or Ligament    Date/Time: 8/31/2022 5:47 PM  Performed by: Angel Tidwell II, DO  Authorized by: Angel Tidwell II, DO   Local anesthesia used: no    Anesthesia:  Local anesthesia used: no    Sedation:  Patient sedated: no    Patient tolerance: patient tolerated the procedure well with no immediate complications  Comments: A right medial epicondyle conjoined flexor tendon injection was performed today.  After risks and benefits were discussed, and patient was identified, the right medial elbow was prepped and draped in usual fashion.  Alcohol and Betadine was used to cleanse the skin.  Ethyl chloride spray was used for skin anesthesia. A 22-gauge 1/2 inch needle was then used to infiltrate the Tendinous insertion.  1cc of 1% lidocaine without epinephrine and 1cc of 80 mg Depo-Medrol was then injected into the insertion.  The needle was then removed without complication.  Blood loss negligible.  Patient admits to near immediate pain relief with gentle range of motion post injection.     Medications administered: 80 mg methylPREDNISolone acetate 80 MG/ML

## 2022-10-31 ENCOUNTER — TELEPHONE (OUTPATIENT)
Dept: ORTHOPEDIC SURGERY | Facility: CLINIC | Age: 42
End: 2022-10-31

## 2022-11-07 ENCOUNTER — CLINICAL SUPPORT (OUTPATIENT)
Dept: ORTHOPEDIC SURGERY | Facility: CLINIC | Age: 42
End: 2022-11-07

## 2022-11-07 VITALS — OXYGEN SATURATION: 98 % | HEART RATE: 78 BPM | BODY MASS INDEX: 25.82 KG/M2 | HEIGHT: 69 IN | WEIGHT: 174.3 LBS

## 2022-11-07 DIAGNOSIS — M77.11 LATERAL EPICONDYLITIS, RIGHT ELBOW: Primary | ICD-10-CM

## 2022-11-07 PROCEDURE — PRPACP: Performed by: FAMILY MEDICINE

## 2022-11-07 PROCEDURE — 99213 OFFICE O/P EST LOW 20 MIN: CPT | Performed by: FAMILY MEDICINE

## 2022-11-07 NOTE — PROGRESS NOTES
"Primary Care Sports Medicine Office Visit Note     Patient ID: Sergey Jason is a 42 y.o. male.    Chief Complaint:  Chief Complaint   Patient presents with   • Right Elbow - Pain     Injection PRP      HPI:    Mr. Sergey Jason is a 42 y.o. male. The patient presents to the clinic today for right elbow pain.    Patient reports lateral pain has mildly worsened since previous visit. This has become predominant symptom, though he has had some pain in the medial epicondyle as well.      Past Medical History:   Diagnosis Date   • Kidney stone        Past Surgical History:   Procedure Laterality Date   • SHOULDER SURGERY         History reviewed. No pertinent family history.  Social History     Occupational History   • Not on file   Tobacco Use   • Smoking status: Every Day     Packs/day: 0.50     Types: Cigarettes   • Smokeless tobacco: Never   Vaping Use   • Vaping Use: Never used   Substance and Sexual Activity   • Alcohol use: Yes     Comment: occassionally   • Drug use: Yes     Types: Marijuana     Comment: occassionally   • Sexual activity: Defer      Review of Systems   Constitutional: Negative for activity change, fatigue and fever.   Musculoskeletal: Positive for arthralgias.   Skin: Negative for color change and rash.   Neurological: Negative for numbness.     Objective:    Pulse 78   Ht 175.3 cm (69.02\")   Wt 79.1 kg (174 lb 4.8 oz)   SpO2 98%   BMI 25.73 kg/m²     Physical Examination:  Physical Exam  Vitals and nursing note reviewed.   Constitutional:       General: He is not in acute distress.     Appearance: He is well-developed. He is not diaphoretic.   HENT:      Head: Normocephalic and atraumatic.   Eyes:      Conjunctiva/sclera: Conjunctivae normal.   Pulmonary:      Effort: Pulmonary effort is normal. No respiratory distress.   Skin:     General: Skin is warm.      Capillary Refill: Capillary refill takes less than 2 seconds.   Neurological:      Mental Status: He is alert.       Right Elbow " Exam     Comments:  Right elbow examination reveals full range of motion of 0 to 130 degrees. There is moderate tenderness to palpation to the lateral epicondyle in the area of the extensor carpi radialis brevis insertion. He has pain with resisted middle finger extension and resisted supination.        Imaging and other tests:  No new imaging today.    Assessment and Plan:    1. Lateral epicondylitis  2. Extensor carpi ulnaris brevis insertion tear    I discussed pathology and treatment options with the patient today. We discussed Platelet-rich plasma to insertional tear, which the patient elected to proceed with. He tolerated this well under ultrasound guidance without complaint or problem. Return to clinic in 4 weeks for repeat imaging. Otherwise, we discussed avoiding anti-inflammatories for the next 4 weeks.      Transcribed from ambient dictation for Angel Tidwell II,  by Jovana Aponte.  11/07/22   09:51 EST    Patient or patient representative verbalized consent to the visit recording.  I have personally performed the services described in this document as transcribed by the above individual, and it is both accurate and complete.    Disclaimer: Please note that areas of this note were completed with computer voice recognition software.  Quite often unanticipated grammatical, syntax, homophones, and other interpretive errors are inadvertently transcribed by the computer software. Please excuse any errors that have escaped final proofreading.

## 2022-11-07 NOTE — PROGRESS NOTES
"Procedure   - Injection Tendon or Ligament    Date/Time: 11/7/2022 2:35 PM  Performed by: Angel Tidwell II, DO  Authorized by: Angel Tidwell II, DO   Preparation: Patient was prepped and draped in the usual sterile fashion.  Local anesthesia used: no    Anesthesia:  Local anesthesia used: no    Sedation:  Patient sedated: no    Comments: After discussing risk and benefits, the patient was identified, and the right elbow was prepped and draped in usual fashion.  Alcohol and Betadine was used to cleanse the skin.  Ethyl chloride was used for skin anesthesia, and ultrasound was used to visualize the insertion of the extensor carpi radialis brevis tendon into the lateral epicondyle.  Under direct visualization the 22-gauge needle was used to infiltrate the tendon.  4 cc of PRP was then injected under visualization.  Images were saved in the ultrasound machine.  The needle was then removed.  Blood loss negligible.  A bandage was placed.  The patient tolerated procedure incredibly well and had no immediate complications or problems.  RTC in 1 month. Again, discuss no NSAIDs for the next 4-6 weeks.     Angel DORADO \"Chance\" Yaw FOREMAN DO, CAQSM  11/07/22  14:35 EST      Ultrasound guidance: yes         "

## 2022-12-06 ENCOUNTER — OFFICE VISIT (OUTPATIENT)
Dept: ORTHOPEDIC SURGERY | Facility: CLINIC | Age: 42
End: 2022-12-06

## 2022-12-06 VITALS — HEART RATE: 86 BPM | HEIGHT: 69 IN | OXYGEN SATURATION: 98 % | WEIGHT: 174 LBS | BODY MASS INDEX: 25.77 KG/M2

## 2022-12-06 DIAGNOSIS — M25.521 RIGHT ELBOW PAIN: Primary | ICD-10-CM

## 2022-12-06 PROCEDURE — 99213 OFFICE O/P EST LOW 20 MIN: CPT | Performed by: FAMILY MEDICINE

## 2022-12-06 NOTE — PROGRESS NOTES
"Primary Care Sports Medicine Office Visit Note     Patient ID: Sergey Jason is a 42 y.o. male.    Chief Complaint:  Chief Complaint   Patient presents with   • Right Elbow - Follow-up, Pain     HPI:    Mr. Sergey Jason is a 42 y.o. male. The patient presents to the clinic today for follow-up of injection of the right elbow.    The patient reports that he is not doing well. He admits that he received zero relief from the platelet-rich plasma injections. The patient states that he was seen by his family physician who provided him with pain medication to get him by. He adds that he is miserable, and cannot exercise. He reports that he is struggling at work. He reports that he has had about 42 cortisone injections. He states that he has not been wearing his brace. He reports that he has tried a compounded cream, however it provided zero relief.     Past Medical History:   Diagnosis Date   • Kidney stone        Past Surgical History:   Procedure Laterality Date   • SHOULDER SURGERY         History reviewed. No pertinent family history.  Social History     Occupational History   • Not on file   Tobacco Use   • Smoking status: Every Day     Packs/day: 0.50     Types: Cigarettes   • Smokeless tobacco: Never   Vaping Use   • Vaping Use: Never used   Substance and Sexual Activity   • Alcohol use: Yes     Comment: occassionally   • Drug use: Yes     Types: Marijuana     Comment: occassionally   • Sexual activity: Defer      Review of Systems   Constitutional: Negative for activity change, fatigue and fever.   Musculoskeletal: Positive for arthralgias.   Skin: Negative for color change and rash.   Neurological: Negative for numbness.     Objective:    Pulse 86   Ht 175.3 cm (69.02\")   Wt 78.9 kg (174 lb)   SpO2 98%   BMI 25.68 kg/m²     Physical Examination:  Physical Exam  Vitals and nursing note reviewed.   Constitutional:       General: He is not in acute distress.     Appearance: He is well-developed. He is not " diaphoretic.   HENT:      Head: Normocephalic and atraumatic.   Eyes:      Conjunctiva/sclera: Conjunctivae normal.   Pulmonary:      Effort: Pulmonary effort is normal. No respiratory distress.   Skin:     General: Skin is warm.      Capillary Refill: Capillary refill takes less than 2 seconds.   Neurological:      Mental Status: He is alert.       Ortho Exam   Right elbow: Examination yields moderate tenderness to palpation over the area of the lateral epicondyle, and medial epicondyle. Varus and valgus stress test about the elbow are negative. Considerable pain with resisted wrist extension, resisted supination, and resisted middle finger extension.    Imaging and other tests:  Diagnostic Musculoskeletal Ultrasound, Limited.  INDICATION: Indication follow-up platelet-rich plasma injection to the right lateral elbow   COMPARATIVE DATA: Injection related imaging dated 4 weeks prior.  FINDINGS: 2D gray scale ultrasound was used to evaluate the ECRB tendinous insertion at the lateral right elbow. There is an area of hypertrophic, hyperechoic change the mid tendinous portion just distal to the insertion, in the area of previous  platelet-rich plasma injection. There is no obvious tear or intratendinous or intramuscular disruption.      Assessment and Plan:    1. Right elbow pain  - MRI Elbow Right Without Contrast; Future    2. Extensor carpi radialis brevis insertional tear    3. Lateral epicondylitis    Ultrasound evaluation today yields considerable healing in the area of previous platelet-rich plasma injection, though symptomatology has even worsened. I recommend continuing topical pain cream, tennis elbow strap brace. The patient would like to continue evaluation with MR to rule out large tear, though this is not seen on ultranographic imaging today. He is interested in surgical debridement/repair if warranted. We will get MR for further evaluation, return to clinic in 5 to 7 days to discuss MRI results and  further treatment options.    Transcribed from ambient dictation for Angel Tidwell II,  by Mandy Crisostomo.  12/06/22   09:06 EST    Patient or patient representative verbalized consent to the visit recording.  I have personally performed the services described in this document as transcribed by the above individual, and it is both accurate and complete.   Mandy Crisostomo    Disclaimer: Please note that areas of this note were completed with computer voice recognition software.  Quite often unanticipated grammatical, syntax, homophones, and other interpretive errors are inadvertently transcribed by the computer software. Please excuse any errors that have escaped final proofreading.

## 2023-01-16 ENCOUNTER — APPOINTMENT (OUTPATIENT)
Dept: GENERAL RADIOLOGY | Facility: HOSPITAL | Age: 43
End: 2023-01-16
Payer: MEDICAID

## 2023-01-16 ENCOUNTER — HOSPITAL ENCOUNTER (EMERGENCY)
Facility: HOSPITAL | Age: 43
Discharge: LEFT BEFORE DISCHARGE | End: 2023-01-16
Attending: EMERGENCY MEDICINE | Admitting: EMERGENCY MEDICINE
Payer: MEDICAID

## 2023-01-16 VITALS
HEART RATE: 86 BPM | DIASTOLIC BLOOD PRESSURE: 89 MMHG | WEIGHT: 175 LBS | SYSTOLIC BLOOD PRESSURE: 161 MMHG | BODY MASS INDEX: 25.92 KG/M2 | HEIGHT: 69 IN | TEMPERATURE: 98.4 F | OXYGEN SATURATION: 97 % | RESPIRATION RATE: 16 BRPM

## 2023-01-16 DIAGNOSIS — J02.9 SORE THROAT: Primary | ICD-10-CM

## 2023-01-16 LAB
ANION GAP SERPL CALCULATED.3IONS-SCNC: 9 MMOL/L (ref 5–15)
BASOPHILS # BLD AUTO: 0 10*3/MM3 (ref 0–0.2)
BASOPHILS NFR BLD AUTO: 0.5 % (ref 0–1.5)
BUN SERPL-MCNC: 11 MG/DL (ref 6–20)
BUN/CREAT SERPL: 9.6 (ref 7–25)
CALCIUM SPEC-SCNC: 9.2 MG/DL (ref 8.6–10.5)
CHLORIDE SERPL-SCNC: 104 MMOL/L (ref 98–107)
CO2 SERPL-SCNC: 26 MMOL/L (ref 22–29)
CREAT SERPL-MCNC: 1.15 MG/DL (ref 0.76–1.27)
DEPRECATED RDW RBC AUTO: 49 FL (ref 37–54)
EGFRCR SERPLBLD CKD-EPI 2021: 81.5 ML/MIN/1.73
EOSINOPHIL # BLD AUTO: 0.2 10*3/MM3 (ref 0–0.4)
EOSINOPHIL NFR BLD AUTO: 1.8 % (ref 0.3–6.2)
ERYTHROCYTE [DISTWIDTH] IN BLOOD BY AUTOMATED COUNT: 14.5 % (ref 12.3–15.4)
GLUCOSE SERPL-MCNC: 116 MG/DL (ref 65–99)
HCT VFR BLD AUTO: 45.5 % (ref 37.5–51)
HGB BLD-MCNC: 15.6 G/DL (ref 13–17.7)
LYMPHOCYTES # BLD AUTO: 2.2 10*3/MM3 (ref 0.7–3.1)
LYMPHOCYTES NFR BLD AUTO: 22.5 % (ref 19.6–45.3)
MAGNESIUM SERPL-MCNC: 2 MG/DL (ref 1.6–2.6)
MCH RBC QN AUTO: 31.9 PG (ref 26.6–33)
MCHC RBC AUTO-ENTMCNC: 34.3 G/DL (ref 31.5–35.7)
MCV RBC AUTO: 93 FL (ref 79–97)
MONOCYTES # BLD AUTO: 1 10*3/MM3 (ref 0.1–0.9)
MONOCYTES NFR BLD AUTO: 9.8 % (ref 5–12)
NEUTROPHILS NFR BLD AUTO: 6.4 10*3/MM3 (ref 1.7–7)
NEUTROPHILS NFR BLD AUTO: 65.4 % (ref 42.7–76)
NRBC BLD AUTO-RTO: 0 /100 WBC (ref 0–0.2)
PLATELET # BLD AUTO: 242 10*3/MM3 (ref 140–450)
PMV BLD AUTO: 7.3 FL (ref 6–12)
POTASSIUM SERPL-SCNC: 4.2 MMOL/L (ref 3.5–5.2)
QT INTERVAL: 346 MS
RBC # BLD AUTO: 4.89 10*6/MM3 (ref 4.14–5.8)
S PYO AG THROAT QL: NEGATIVE
SODIUM SERPL-SCNC: 139 MMOL/L (ref 136–145)
TSH SERPL DL<=0.05 MIU/L-ACNC: 0.96 UIU/ML (ref 0.27–4.2)
WBC NRBC COR # BLD: 9.8 10*3/MM3 (ref 3.4–10.8)

## 2023-01-16 PROCEDURE — 80048 BASIC METABOLIC PNL TOTAL CA: CPT | Performed by: NURSE PRACTITIONER

## 2023-01-16 PROCEDURE — 71045 X-RAY EXAM CHEST 1 VIEW: CPT

## 2023-01-16 PROCEDURE — 83735 ASSAY OF MAGNESIUM: CPT | Performed by: NURSE PRACTITIONER

## 2023-01-16 PROCEDURE — 87651 STREP A DNA AMP PROBE: CPT | Performed by: NURSE PRACTITIONER

## 2023-01-16 PROCEDURE — 85025 COMPLETE CBC W/AUTO DIFF WBC: CPT | Performed by: NURSE PRACTITIONER

## 2023-01-16 PROCEDURE — 84443 ASSAY THYROID STIM HORMONE: CPT | Performed by: NURSE PRACTITIONER

## 2023-01-16 PROCEDURE — 93005 ELECTROCARDIOGRAM TRACING: CPT | Performed by: NURSE PRACTITIONER

## 2023-01-16 PROCEDURE — 99211 OFF/OP EST MAY X REQ PHY/QHP: CPT | Performed by: EMERGENCY MEDICINE

## 2023-01-16 PROCEDURE — 99283 EMERGENCY DEPT VISIT LOW MDM: CPT

## 2023-01-16 NOTE — NURSING NOTE
Patient evaluated by provider and determined to be stable.  Patient will return to waiting room, pending further evaluation & testing / monitoring.  Patient instructed to alert staff for change in condition or if leaving premises.   Patient evaluated by provider and will return to waiting room with Intravenous line in place.  Patient has been instructed not to inject anything into IV, or leave premises with line in place. Patient pending further evaluation, treatment, testing / monitoring.

## 2023-01-16 NOTE — ED PROVIDER NOTES
Subjective    Provider in Triage Note       Chief complaint: Sore throat, metal taste in mouth, headache, palpitations x 1 month.      Context: 42-year-old male presents ambulatory by private vehicle with multiple complaints.  States he has had a sore throat with occasional dysphagia and a taste of metal in his mouth with an intermittent mild headache and palpitations for the last month.  Said his only prescription medication is trazodone for insomnia.  No recent antibiotics.  He denies any poor dentition as he reports he wears upper and lower dentures.  No abdominal pain nausea vomiting diarrhea urinary complaints.  No chest pain shortness of breath.  Mild cough with nasal congestion.    Location:  As above  Duration: A month    Timing: Intermittent  Quality/Severity: Mild  Modifying factors:no meds  Associated symptoms:        Additional hx provided by: self    PCP:                    History of Present Illness    Review of Systems   Constitutional: Negative for fever.   HENT: Positive for congestion.    Eyes: Negative for visual disturbance.   Respiratory: Positive for cough. Negative for shortness of breath.    Cardiovascular: Positive for palpitations. Negative for chest pain and leg swelling.   Gastrointestinal: Negative.    Genitourinary: Negative.    Musculoskeletal: Negative.    Skin: Negative for rash.   Allergic/Immunologic: Negative for immunocompromised state.   Neurological: Negative.    Hematological: Does not bruise/bleed easily.   Psychiatric/Behavioral: Negative for confusion.       Past Medical History:   Diagnosis Date   • Kidney stone        Allergies   Allergen Reactions   • Penicillins Unknown (See Comments)     States he is unsure       Past Surgical History:   Procedure Laterality Date   • SHOULDER SURGERY         No family history on file.    Social History     Socioeconomic History   • Marital status:    Tobacco Use   • Smoking status: Every Day     Packs/day: 0.50     Types:  Cigarettes   • Smokeless tobacco: Never   Vaping Use   • Vaping Use: Never used   Substance and Sexual Activity   • Alcohol use: Yes     Comment: occassionally   • Drug use: Yes     Types: Marijuana     Comment: occassionally   • Sexual activity: Defer           Objective   Physical Exam    Procedures           ED Course                                           Medical Decision Making  Patient was initially seen and evaluated by pit provider and was noted to elope per nursing staff prior to main provider evaluation and test results    Sore throat: acute illness or injury  Amount and/or Complexity of Data Reviewed  Labs: ordered.  Radiology: ordered.  ECG/medicine tests: ordered.          Final diagnoses:   Sore throat       ED Disposition  ED Disposition     ED Disposition   Eloped    Condition   --    Comment   --             No follow-up provider specified.       Medication List      No changes were made to your prescriptions during this visit.          Fabi Canela, APRN  01/17/23 0911

## 2023-01-17 NOTE — ED NOTES
Pt states he is leaving.  NAD at this time.  States he thinks he just had a panic attack and will follow up with his PCP

## 2023-05-10 ENCOUNTER — OFFICE VISIT (OUTPATIENT)
Dept: ORTHOPEDIC SURGERY | Facility: CLINIC | Age: 43
End: 2023-05-10
Payer: MEDICAID

## 2023-05-10 VITALS — HEIGHT: 69 IN | OXYGEN SATURATION: 97 % | WEIGHT: 175 LBS | BODY MASS INDEX: 25.92 KG/M2

## 2023-05-10 DIAGNOSIS — M51.36 LUMBAR DEGENERATIVE DISC DISEASE: ICD-10-CM

## 2023-05-10 DIAGNOSIS — M54.50 ACUTE MIDLINE LOW BACK PAIN, UNSPECIFIED WHETHER SCIATICA PRESENT: Primary | ICD-10-CM

## 2023-05-10 RX ORDER — TIZANIDINE HYDROCHLORIDE 4 MG/1
4 CAPSULE, GELATIN COATED ORAL 3 TIMES DAILY
Qty: 30 CAPSULE | Refills: 0 | Status: SHIPPED | OUTPATIENT
Start: 2023-05-10

## 2023-05-10 RX ORDER — TRAMADOL HYDROCHLORIDE 50 MG/1
50 TABLET ORAL EVERY 4 HOURS PRN
Qty: 30 TABLET | Refills: 0 | Status: SHIPPED | OUTPATIENT
Start: 2023-05-10 | End: 2023-05-10

## 2023-05-10 RX ORDER — TRAMADOL HYDROCHLORIDE 50 MG/1
50 TABLET ORAL EVERY 4 HOURS PRN
Qty: 30 TABLET | Refills: 0 | Status: SHIPPED | OUTPATIENT
Start: 2023-05-10 | End: 2023-05-12 | Stop reason: SDUPTHER

## 2023-05-10 RX ORDER — TRAZODONE HYDROCHLORIDE 100 MG/1
TABLET ORAL
COMMUNITY
Start: 2023-04-10

## 2023-05-10 NOTE — PROGRESS NOTES
Primary Care Sports Medicine Office Visit Note     Patient ID: Sergey Jason is a 42 y.o. male.    Chief Complaint:  Chief Complaint   Patient presents with   • Lower Back - Initial Evaluation, Pain     Pain 10/10     HPI:    Mr. Sergey Jason is a 42 y.o. male. The patient presents to the clinic today for back pain evaluation.    Back pain  The patient complains of pain from the top of his neck all the way down. He reports severe flank pain. He knows he has a shrinking spinal column and degenerative disc. The patient had x-rays and MRIs completed. They wanted him to see a neurologist, but he can not get in there until he gets something. The patient has been on the couch for 2 days in pain. He was going to go to the emergency room, but they told him he needed to get him in quicker. The patient has anterolisthesis of L5. He had an MRI of his neck without contrast. The patient gets headaches and dizzy spells. The worst problem right now is his lower flank. The patient is urinating okay. He has had a torn kidney stone in the past. The patient has not had a kidney stone since he quit drinking. The patient has not been working out for about a month. He denies any lightening, tingling, or shooting pain down his leg. The patient has tried ibuprofen, baclofen, and a steroid pack. Mesha gave him hydrocodone 5 mg, and he usually takes 1 in the morning, 1 before he goes to work, and 1 at night to sleep. The patient takes trazodone for sleep.    Past Medical History:   Diagnosis Date   • Kidney stone        Past Surgical History:   Procedure Laterality Date   • SHOULDER SURGERY         History reviewed. No pertinent family history.  Social History     Occupational History   • Not on file   Tobacco Use   • Smoking status: Every Day     Packs/day: 0.50     Types: Cigarettes   • Smokeless tobacco: Never   Vaping Use   • Vaping Use: Never used   Substance and Sexual Activity   • Alcohol use: Not Currently     Comment:  "occassionally   • Drug use: Yes     Types: Marijuana     Comment: occassionally   • Sexual activity: Defer      Review of Systems   Constitutional: Negative for activity change, fatigue and fever.   Musculoskeletal: Positive for arthralgias.   Skin: Negative for color change and rash.   Neurological: Negative for numbness.     Objective:    Ht 175.3 cm (69\")   Wt 79.4 kg (175 lb)   SpO2 97%   BMI 25.84 kg/m²     Physical Examination:  Physical Exam  Vitals and nursing note reviewed.   Constitutional:       General: He is not in acute distress.     Appearance: He is well-developed. He is not diaphoretic.   HENT:      Head: Normocephalic and atraumatic.   Eyes:      Conjunctiva/sclera: Conjunctivae normal.   Pulmonary:      Effort: Pulmonary effort is normal. No respiratory distress.   Musculoskeletal:      Lumbar back: Negative right straight leg raise test.   Skin:     General: Skin is warm.      Capillary Refill: Capillary refill takes less than 2 seconds.   Neurological:      Mental Status: He is alert.       Right Knee Exam     Range of Motion   The patient has normal right knee ROM.      Right Hip Exam     Tenderness   The patient is experiencing no tenderness.     Range of Motion   The patient has normal right hip ROM.    Muscle Strength   The patient has normal right hip strength.    Tests   LANDON: negative  Fadir:  Negative FADIR test    Other   Erythema: absent  Sensation: normal  Pulse: present    Comments:  Neg log roll, neg stenchfield, neg scour. Tenderness to palpation to right sided paraspinal lumbar musculature and positive straight leg raise.      Back Exam     Tenderness   The patient is experiencing no tenderness.     Range of Motion   The patient has normal back ROM.    Muscle Strength   The patient has normal back strength.    Tests   Straight leg raise right: negative    Reflexes   Patellar: normal    Other   Sensation: normal  Erythema: no back redness           Imaging and other tests:  XR " of the lumbar spine today reveals bilateral spondylosis with grade 1 spondylolisthesis L5 on S1.  No significant change from previous.    Assessment and Plan:    1. Acute midline low back pain, unspecified whether sciatica present  - XR Spine Lumbar 2 or 3 View  - TiZANidine (ZANAFLEX) 4 MG capsule; Take 1 capsule by mouth 3 (Three) Times a Day.  Dispense: 30 capsule; Refill: 0  - traMADol (ULTRAM) 50 MG tablet; Take 1 tablet by mouth Every 4 (Four) Hours As Needed for Moderate Pain.  Dispense: 30 tablet; Refill: 0    2. Lumbar degenerative disc disease    The patient has already attempted anti-inflammatory and steroid with muscle relaxer from his primary care physician. He has run out of the muscle relaxer which was the only one that seemed to be moderately helpful. We discussed lumbar radiculopathy and the amount of pain that he is in currently. I recommend he continue muscle relaxer at bedtime, and get in with pain management for further evaluation of potential lumbar epidural injection if warranted. We also discussed one time prescription for tramadol for acute pain relief. Return to clinic in 2 to 3 months if symptoms persist or worsen, otherwise follow up with pain management for further evaluation and treatment as they see fit.    Transcribed from ambient dictation for Angel Tidwell II,  by Zulema Barakat.  05/10/23   14:50 EDT    Patient or patient representative verbalized consent to the visit recording.  I have personally performed the services described in this document as transcribed by the above individual, and it is both accurate and complete.    Disclaimer: Please note that areas of this note were completed with computer voice recognition software.  Quite often unanticipated grammatical, syntax, homophones, and other interpretive errors are inadvertently transcribed by the computer software. Please excuse any errors that have escaped final proofreading.

## 2023-05-11 ENCOUNTER — TELEPHONE (OUTPATIENT)
Dept: ORTHOPEDIC SURGERY | Facility: CLINIC | Age: 43
End: 2023-05-11

## 2023-05-11 NOTE — TELEPHONE ENCOUNTER
"Please see message. \"Transmission to pharmacy failed \" is the receipt  for processing of script.   "

## 2023-05-11 NOTE — TELEPHONE ENCOUNTER
Caller: Sergey Jason    Relationship: Self    Best call back number:     What was the call regarding: THE PATIENT STATED [facility]'S SYSTEM WENT DOWN YESTERDAY AND THEY WERE UNABLE TO RECEIVE PRESCRIPTIONS. HE WOULD LIKE HIS TRAMADOL RX RESENT TO [facility], PLEASE.     [id number] - [place], [place] - [address] - [phone]  - [phone] FX  [phone]    Do you require a callback: YES, WHEN RX BEEN RESENT

## 2023-05-12 RX ORDER — TRAMADOL HYDROCHLORIDE 50 MG/1
50 TABLET ORAL EVERY 4 HOURS PRN
Qty: 30 TABLET | Refills: 0 | Status: SHIPPED | OUTPATIENT
Start: 2023-05-12

## 2023-05-12 NOTE — PROGRESS NOTES
Spoke with patient and confirmed Dr Tidwell Resent the prescription in to his pharmacy. Advised patient to call pharmacy in approximately 1 hour to confirm receipt of prescription and to call the office if any problems.

## 2023-05-19 ENCOUNTER — TELEPHONE (OUTPATIENT)
Dept: ORTHOPEDIC SURGERY | Facility: CLINIC | Age: 43
End: 2023-05-19

## 2023-05-19 NOTE — TELEPHONE ENCOUNTER
Caller: Sergey Jason    Relationship to patient: Self    Best call back number:     Patient is needing: PLEASE SEND PATIENT A REFERRAL OVER FOR PAIN MANAGEMENT

## 2023-05-19 NOTE — TELEPHONE ENCOUNTER
Call placed to patient and informed him that pain management referral was entered. I gave him the number to pain management to call and get scheduled. He voiced understanding and thanked me for calling him back.

## 2023-05-31 NOTE — PROGRESS NOTES
CHIEF COMPLAINT  Lower back pain - has pain from neck to all the way down to lower back.       Subjective   Sergey Jason is a 42 y.o. male who was referred by Dr. Tidwell, Angel Jimenez II, DO to our pain management clinic for consultation, evaluation and treatment of lower back pain and neck pain. Pain started about 3 months ago without any inciting events. Has neck pain and lower back currently.   He has tried ibuprofen, baclofen, Medrol Dosepak, hydrocodone, tramadol without significant pain relief.  He was started on tizanidine and tramadol by Dr. Tidwell on 5/19/2023. Patient has been referred to neurology, but can't see till 8/23.    Lower back pain is 8/10 on VAS, at maximum is 10/10. Pain is aching, throbbing, sharp, spasms in nature. Pain is not referred. The pain is constant. The pain is improved by nothing. The pain is worse with everything. Pain is effecting her abilities to daily activities, mood, sleep.     Neck pain is 5/10 on VAS, at maximum is 8/10. Pain is tightness, sharp in nature. Pain is referred b/l trapezius. The pain is constnat. The pain is improved by massages. The pain is worse with sitting for long time. +headache from left occipital region to top of his head.     PHQ-9- 6    SOAPP- 13 - struggled with EtoH in past   [place] back disability scale - 27    PMH:   Kidney stones, Left shoulder surgeryX2, smoker    Current Medications:   Tizanidine 4 mg - was helping  Trazodone      Past Medications:  Tramadol 50 mg - felt like taking ibuprofen  Tylenol   Ibuprofen  Medrol dose pack    Past Modalities:  TENS:       no          Physical Therapy Within The Last 6 Months     no  Psychotherapy     no  Massage Therapy      no    Patient Complains Of:  Uro-Fecal Incontinence no  Weight Gain/Loss  no  Fever/Chills   no  Weakness   no      PEG Assessment   What number best describes your pain on average in the past week?8  What number best describes how, during the past week, pain has interfered with  your enjoyment of life?8  What number best describes how, during the past week, pain has interfered with your general activity?  8        Current Outpatient Medications:   •  busPIRone (BUSPAR) 15 MG tablet, TAKE 1 TABLET BY MOUTH TWO TIMES A DAY. MAY INCREASE TO TAKE 2 TABLETS TWICE A DAY AFTER 1 WEEK, Disp: , Rfl:   •  Multiple Vitamins-Minerals (MULTIVITAMIN WITH MINERALS) tablet tablet, Take 1 tablet by mouth Daily., Disp: , Rfl:   •  traZODone (DESYREL) 100 MG tablet, take 2 tablet by mouth every day after meals, Disp: , Rfl:   •  traZODone (DESYREL) 150 MG tablet, Take 200 mg by mouth Every Night., Disp: , Rfl:   •  baclofen (LIORESAL) 10 MG tablet, Take 1 tablet by mouth 3 (Three) Times a Day for 60 days., Disp: 90 tablet, Rfl: 1  •  meloxicam (MOBIC) 15 MG tablet, Take 1 tablet by mouth Daily As Needed for Moderate Pain for up to 60 days., Disp: 30 tablet, Rfl: 1  •  traMADol (ULTRAM) 50 MG tablet, Take 1 tablet by mouth 3 (Three) Times a Day As Needed for Severe Pain for up to 30 days., Disp: 90 tablet, Rfl: 0    The following portions of the patient's history were reviewed and updated as appropriate: allergies, current medications, past family history, past medical history, past social history, past surgical history, and problem list.      REVIEW OF PERTINENT MEDICAL DATA    Past Medical History:   Diagnosis Date   • Back pain    • Kidney stone    • Neck pain      Past Surgical History:   Procedure Laterality Date   • SHOULDER SURGERY       Family History   Problem Relation Age of Onset   • No Known Problems Mother    • Heart attack Father      Social History     Socioeconomic History   • Marital status:    Tobacco Use   • Smoking status: Every Day     Packs/day: 0.50     Types: Cigarettes   • Smokeless tobacco: Never   Vaping Use   • Vaping Use: Never used   Substance and Sexual Activity   • Alcohol use: Not Currently     Comment: occassionally   • Drug use: Yes     Types: Marijuana     Comment:  occassionally. Patient states he do not do any drugs   • Sexual activity: Defer         Review of Systems      Vitals:    06/01/23 1536   BP: 130/89   Pulse: 77   Resp: 16   SpO2: 96%   PainSc:   8         Objective   Physical Exam  Neck:     Musculoskeletal:         General: Tenderness present.        Back:    Neurological:      Deep Tendon Reflexes:      Reflex Scores:       Tricep reflexes are 2+ on the right side and 2+ on the left side.       Bicep reflexes are 2+ on the right side and 2+ on the left side.       Brachioradialis reflexes are 2+ on the right side and 2+ on the left side.       Patellar reflexes are 2+ on the right side and 2+ on the left side.       Achilles reflexes are 2+ on the right side and 2+ on the left side.     Comments: Motor strength 5/5 b/l LE, UE  Sensory intact b/l LE, UE             Imaging Reviewed:  CT cervical spine without contrast-2/21/2023  - Multilevel spondylosis with areas of disc osteophyte complex formation  - Facet arthropathy at multiple levels most most notable at C6-7.  - Limited spinal canal and neural foraminal evaluation with likely mild to moderate stenosis present.    Lumbar x-ray-5/10/2023  - Disc space narrowing at L5-S1.  - Grade 1 anterolisthesis of L5 on S1 with bilateral pars intercularis defect    X-ray thoracic spine-1/1/2021  - No acute findings    X-ray cervical spine-1/1/2021  - Cervical spondylosis at C5-C6 and C6-7.    CT abdominal pelvis without IV contrast-10/5/2022  - Small nonobstructing colliculi in left kidney.  Largest is in the upper to mid left kidney and measures approximately 3 to 4 mm.    Assessment:    1. Sacroiliac joint dysfunction    2. Cervical spondylosis    3. Pars defect with spondylolisthesis    4. High risk medication use         Plan:   1. New patient visit, urine drug screen per office policy. UDS today to monitor for compliance with medication use. The UDS is medically necessary to monitor for compliance due to the potential  side effects and complications of misuse of opioids. UDS done today will review on next visit.   Narcotic contract signed on 6/1/2023.  Point-of-care negative for any nonprescribed substances.  Patient had alcohol issues in the past.  Discussed with patient that he is at significantly higher risk of addiction with pain medications and I do not recommend pain medications for longer period of time.  2. We discussed trying a course of formal physical therapy.  Physical therapy can help strengthen and stretch the muscles around the joints. Continue to be as active as possible. Start physical therapy as it will help generalized pain and follow up with HEP.  PT referral sent to [facility] - 6/1/23.  Assigned home exercises program to be started on 6/1/2023.  3.  Patient has failed Tylenol and ibuprofen.  Start meloxicam 15 mg daily PRN. Patient informed of increased risk of heart attacks, stroke and kidney problems in addition to gastric ulcers with use of non-steroidal anti-inflammatory medications.  4.  He states that baclofen had worked better in the past.  Start baclofen 10 mg TID PRN. Side effect profile discussed with the patient including but not limited to transient drowsiness, dizziness, weakness, fatigue, confusion, headache, insomnia, nausea, constipation and urinary frequency. Patient understands and agrees.  5.  Only for severe pain, start tramadol 50 mg TID PRN. Discussed with the patient regarding long-term side effects of opioids including but not limited to opioid induced hormonal suppression, hyperalgesia, fatigue, weight gain, possible opioid induced altered immune system, addiction, tolerance, dependence, risk of hearing loss and elevated risk of myocardial infarction. Proper use and potential life threatening side effects of over use discussed with patient. Patient states understanding of their use and risks.  6.  Reviewed lumbar x-ray with patient.  Lumbar x-ray shows pars interarticularis defect at L5  with anterolisthesis grade 1.  Patient is pointing his pain at the SI joints bilaterally but more left than right side. Patient has pain in the lower back,  b/l SI joint tenderness was positive. Guerline test, SI joint compression and thigh thurst test were performed and were positive for SI joint pathology. Discussed B/l SI joint injection under fluoro, Discussed the possibility of infection, bleeding, nerve damage, headache, increased pain, paraplegia. Patient understands and agrees.  We will consider this if he fails conservative management.  7.  Discussed cervical spine x-ray and cervical CT scan.  CT of cervical spine shows facet arthritis most notably at C6-C7 level.  Patient has bilateral facet tenderness and facet loading positive bilateral C4-C7.  Discussed with patient that we may consider bilateral MBB C5-C7 in future if pain does not improve with conservative management.  Patient understands and agrees with the plan.      RTC in 6 weeks    Juan David Dior DO  Pain Management   [facility]     Greater than 60 minutes was spent with the patient, reviewing records, reviewing images, performing the exam, discussing diagnosis and further treatment options, etc., and greater than 50% was spent on education      INSPECT REPORT    As part of the patient's treatment plan, I may be prescribing controlled substances. The patient has been made aware of appropriate use of such medications, including potential risk of somnolence, limited ability to drive and/or work safely, and the potential for dependence or overdose. It has also been made clear that these medications are for use by this patient only, without concomitant use of alcohol or other substances unless prescribed.     Patient has completed prescribing agreement detailing terms of continued prescribing of controlled substances, including monitoring INSPECT reports, urine drug screening, and pill counts if necessary. The patient is aware that inappropriate use  will results in cessation of prescribing such medications.    INSPECT report has been reviewed and scanned into the patient's chart.      EMR Dragon/Transcription Disclaimer:   Much of this encounter note is an electronic transcription/translation of spoken language to printed text. The electronic translation of spoken language may permit erroneous, or at times, nonsensical words or phrases to be inadvertently transcribed; Although I have reviewed the note for such errors, some may still exist.

## 2023-06-01 ENCOUNTER — OFFICE VISIT (OUTPATIENT)
Dept: PAIN MEDICINE | Facility: CLINIC | Age: 43
End: 2023-06-01

## 2023-06-01 VITALS
DIASTOLIC BLOOD PRESSURE: 89 MMHG | OXYGEN SATURATION: 96 % | HEART RATE: 77 BPM | RESPIRATION RATE: 16 BRPM | SYSTOLIC BLOOD PRESSURE: 130 MMHG

## 2023-06-01 DIAGNOSIS — Z79.899 HIGH RISK MEDICATION USE: ICD-10-CM

## 2023-06-01 DIAGNOSIS — M53.3 SACROILIAC JOINT DYSFUNCTION: Primary | ICD-10-CM

## 2023-06-01 DIAGNOSIS — M47.812 CERVICAL SPONDYLOSIS: ICD-10-CM

## 2023-06-01 DIAGNOSIS — M43.10 PARS DEFECT WITH SPONDYLOLISTHESIS: ICD-10-CM

## 2023-06-01 DIAGNOSIS — M43.00 PARS DEFECT WITH SPONDYLOLISTHESIS: ICD-10-CM

## 2023-06-01 RX ORDER — MELOXICAM 15 MG/1
15 TABLET ORAL DAILY PRN
Qty: 30 TABLET | Refills: 1 | Status: SHIPPED | OUTPATIENT
Start: 2023-06-01 | End: 2023-07-31

## 2023-06-01 RX ORDER — BACLOFEN 10 MG/1
10 TABLET ORAL 3 TIMES DAILY
Qty: 90 TABLET | Refills: 1 | Status: SHIPPED | OUTPATIENT
Start: 2023-06-01 | End: 2023-07-31

## 2023-06-01 RX ORDER — TRAMADOL HYDROCHLORIDE 50 MG/1
50 TABLET ORAL 3 TIMES DAILY PRN
Qty: 90 TABLET | Refills: 0 | Status: SHIPPED | OUTPATIENT
Start: 2023-06-01 | End: 2023-06-02 | Stop reason: SDUPTHER

## 2023-06-01 RX ORDER — BUSPIRONE HYDROCHLORIDE 15 MG/1
TABLET ORAL
COMMUNITY
Start: 2023-05-03

## 2023-06-02 ENCOUNTER — TELEPHONE (OUTPATIENT)
Dept: PAIN MEDICINE | Facility: CLINIC | Age: 43
End: 2023-06-02

## 2023-06-02 DIAGNOSIS — M43.10 PARS DEFECT WITH SPONDYLOLISTHESIS: ICD-10-CM

## 2023-06-02 DIAGNOSIS — M47.812 CERVICAL SPONDYLOSIS: ICD-10-CM

## 2023-06-02 DIAGNOSIS — M43.00 PARS DEFECT WITH SPONDYLOLISTHESIS: ICD-10-CM

## 2023-06-02 DIAGNOSIS — M53.3 SACROILIAC JOINT DYSFUNCTION: ICD-10-CM

## 2023-06-02 RX ORDER — TRAMADOL HYDROCHLORIDE 50 MG/1
50 TABLET ORAL 3 TIMES DAILY PRN
Qty: 90 TABLET | Refills: 0 | Status: SHIPPED | OUTPATIENT
Start: 2023-06-02 | End: 2023-07-02

## 2023-06-02 NOTE — TELEPHONE ENCOUNTER
Caller: Sergey Jason    Relationship to patient: Self    Best call back number: [phone]    Patient is needing: ALL [facility] ARE OUT OF TRAMADOL. WONDERING IF THERE IS A DIFFERENT RX THAT COULD BE CALLED IN THAT WOULD HELP HIM

## 2023-07-28 ENCOUNTER — HOSPITAL ENCOUNTER (OUTPATIENT)
Dept: PAIN MEDICINE | Facility: HOSPITAL | Age: 43
Discharge: HOME | End: 2023-07-28
Payer: MEDICAID

## 2023-07-28 VITALS
OXYGEN SATURATION: 98 % | HEART RATE: 67 BPM | RESPIRATION RATE: 16 BRPM | SYSTOLIC BLOOD PRESSURE: 127 MMHG | HEIGHT: 69 IN | DIASTOLIC BLOOD PRESSURE: 78 MMHG | TEMPERATURE: 97.3 F | BODY MASS INDEX: 25.92 KG/M2 | WEIGHT: 175 LBS

## 2023-07-28 DIAGNOSIS — M53.3 SACROILIAC JOINT DYSFUNCTION: Primary | ICD-10-CM

## 2023-07-28 DIAGNOSIS — R52 PAIN: ICD-10-CM

## 2023-07-28 PROCEDURE — 27096 INJECT SACROILIAC JOINT: CPT | Performed by: STUDENT IN AN ORGANIZED HEALTH CARE EDUCATION/TRAINING PROGRAM

## 2023-07-28 PROCEDURE — 25010000002 BUPIVACAINE (PF) 0.25 % SOLUTION: Performed by: STUDENT IN AN ORGANIZED HEALTH CARE EDUCATION/TRAINING PROGRAM

## 2023-07-28 PROCEDURE — 25010000002 METHYLPREDNISOLONE PER 80 MG: Performed by: STUDENT IN AN ORGANIZED HEALTH CARE EDUCATION/TRAINING PROGRAM

## 2023-07-28 PROCEDURE — 77003 FLUOROGUIDE FOR SPINE INJECT: CPT

## 2023-07-28 RX ORDER — LIDOCAINE HYDROCHLORIDE 10 MG/ML
5 INJECTION, SOLUTION EPIDURAL; INFILTRATION; INTRACAUDAL; PERINEURAL ONCE
Status: COMPLETED | OUTPATIENT
Start: 2023-07-28 | End: 2023-07-28

## 2023-07-28 RX ORDER — BUPIVACAINE HYDROCHLORIDE 2.5 MG/ML
10 INJECTION, SOLUTION EPIDURAL; INFILTRATION; INTRACAUDAL ONCE
Status: COMPLETED | OUTPATIENT
Start: 2023-07-28 | End: 2023-07-28

## 2023-07-28 RX ORDER — METHYLPREDNISOLONE ACETATE 80 MG/ML
80 INJECTION, SUSPENSION INTRA-ARTICULAR; INTRALESIONAL; INTRAMUSCULAR; SOFT TISSUE ONCE
Status: COMPLETED | OUTPATIENT
Start: 2023-07-28 | End: 2023-07-28

## 2023-07-28 RX ADMIN — LIDOCAINE HYDROCHLORIDE 5 ML: 10 INJECTION, SOLUTION EPIDURAL; INFILTRATION; INTRACAUDAL; PERINEURAL at 09:32

## 2023-07-28 RX ADMIN — METHYLPREDNISOLONE ACETATE 80 MG: 80 INJECTION, SUSPENSION INTRA-ARTICULAR; INTRALESIONAL; INTRAMUSCULAR; SOFT TISSUE at 09:34

## 2023-07-28 RX ADMIN — BUPIVACAINE HYDROCHLORIDE 9 ML: 2.5 INJECTION, SOLUTION EPIDURAL; INFILTRATION; INTRACAUDAL; PERINEURAL at 09:34

## 2023-07-28 NOTE — H&P
H and P reviewed from previous visit and no changes to patient's clinical presentation. Will proceed with procedure as planned. Patient denies history of DM and being on blood thinners.    Juan David Dior DO  Pain Management   [facility]

## 2023-07-28 NOTE — PROCEDURES
PREOPERATIVE DIAGNOSIS:    B/l SI Joint Arthropathy      POSTOPERATIVE DIAGNOSIS:  Same.     PROCEDURE: b/l sacroiliac joint steroid injection     PROCEDURE IN DETAIL:  The patient was placed in a prone position and the lower back was prepped with chloraprep and draped in the usual sterile fashion.  The skin overlaying the left SI joint was infiltrated with 1% lidocaine for local anesthesia.  A 22-gauge 3.5 inch spinal needle was inserted through the skin under fluoroscopic guidance until we got to the lower third of the SI joint. Another needle was placed in ryan upper third of the joint. 2 cc of 0.25% marcaine with depo-medrol was injected at each level. Same steps were repeated on right side. A total of 8 cc of 0.25% marcaine with 80 mg of depo-medrol was injected.     After the procedure the needles were flushed with preservative free local anesthetic and removed. Skin was cleaned and a sterile dressing was applied.    Following the procedure the patient's vital signs were stable. The patient was discharged home in good condition after being given discharge instructions.    COMPLICATIONS: None    Juan David Dior DO  Pain Management   [facility]

## 2023-08-02 ENCOUNTER — TELEPHONE (OUTPATIENT)
Dept: PAIN MEDICINE | Facility: CLINIC | Age: 43
End: 2023-08-02

## 2023-08-02 PROBLEM — F10.10 ALCOHOL ABUSE: Status: ACTIVE | Noted: 2023-08-02

## 2023-08-02 PROBLEM — N20.0 CALCULUS OF KIDNEY: Status: ACTIVE | Noted: 2023-08-02

## 2023-08-02 RX ORDER — CYCLOBENZAPRINE HCL 10 MG
10 TABLET ORAL 3 TIMES DAILY
Qty: 42 TABLET | Refills: 0 | Status: SHIPPED | OUTPATIENT
Start: 2023-08-02 | End: 2023-08-16

## 2023-08-02 NOTE — TELEPHONE ENCOUNTER
Caller: PATIENT    Relationship to patient: SELF     Best call back number: [phone]    Patient is needing: PATIENT WAS CALLING TO SEE IF DR. MEJIA COULD PRESCRIBE HIM A DIFFERENT TYPE OF MUSCLE RELAXER DUE THE CURRENT MEDICATION NOT HELPING. PATIENT STATED HE NOTICED AFTER HIS PROCEDURE THAT THIS MEDICATION DOES NOT SEEM TO BE HELPING. PATIENT DOES HAVE AN UPCOMING APPT WITH DR. MEJIA BUT WOULD LIKE TO DISCUSS THIS BEFORE HIS APPT IF POSSIBLE. THANK YOU!

## 2023-09-28 ENCOUNTER — TELEPHONE (OUTPATIENT)
Dept: PAIN MEDICINE | Facility: CLINIC | Age: 43
End: 2023-09-28
Payer: MEDICAID

## 2023-09-28 NOTE — TELEPHONE ENCOUNTER
Caller: Sergey Jason    Relationship to patient: Self    Best call back number: [phone]    Chief complaint: BACK PAIN     Type of visit: INJECTION LIKE BEFORE DONE AT THE HOSPITAL     Requested date: ASAP     If rescheduling, when is the original appointment: N/A     Additional notes: PLEASE CALL PATIENT FOR SCHEDULING. OKAY TO LEAVE A VOICEMAIL.

## 2023-09-28 NOTE — PROGRESS NOTES
Subjective   Sergey Jason is a 43 y.o. male is here for follow up for lower back pain and neck pain.  Last seen for bilateral SI joint injections with excellent relief, but pain is returning for last 1 week. .    On last visit:        Lower back pain is 7/10 on VAS, at maximum is 10/10. Pain is aching, throbbing, sharp, spasms in nature. Pain is referred to b/l buttocks. The pain is constant. The pain is improved by SI joint injections. The pain is worse with everything. Pain is effecting her abilities to daily activities, mood, sleep.      Neck pain is 5/10 on VAS, at maximum is 8/10. Pain is tightness, sharp in nature. Pain is referred b/l trapezius. The pain is constnat. The pain is improved by massages. The pain is worse with sitting for long time. +headache from left occipital region to top of his head.     Previous Injection:   7/28/2023-bilateral SI joint injections - 95% pain relief with diagnostic block; 95% pain relief for 2 months. Able to play golf, work out, didn't need pain meds.     Hx: [facility] and neck pain. Pain started about 3 months ago without any inciting events. Has neck pain and lower back currently.   He has tried ibuprofen, baclofen, Medrol Dosepak, hydrocodone, tramadol without significant pain relief.  He was started on tizanidine and tramadol by Dr. Tidwell on 5/19/2023. Patient has been referred to neurology, but can't see till 8/23.       PHQ-9- 6                       SOAPP- 13 - struggled with EtoH in past   [place] back disability scale - 27     PMH:   Kidney stones, Left shoulder surgeryX2, smoker     Current Medications:   Tizanidine 4 mg   Tramadol 50 mg TID PRN  Trazodone        Past Medications:  Tramadol 50 mg - felt like taking ibuprofen  Tylenol   Ibuprofen  Medrol dose pack  Baclofen - didn't help      Past Modalities:  TENS:                                                                          no                                                   Physical Therapy Within The Last 6 Months              yes - [facility] since 6/13/23 -8 weeks-no significant improvement.  Pain worsened  Psychotherapy                                                            no  Massage Therapy                                                       no     Patient Complains Of:  Uro-Fecal Incontinence          no  Weight Gain/Loss                   no  Fever/Chills                             no  Weakness                               no        PEG Assessment   What number best describes your pain on average in the past week?8  What number best describes how, during the past week, pain has interfered with your enjoyment of life?8  What number best describes how, during the past week, pain has interfered with your general activity?  8             Current Outpatient Medications:     busPIRone (BUSPAR) 15 MG tablet, TAKE 1 TABLET BY MOUTH TWO TIMES A DAY. MAY INCREASE TO TAKE 2 TABLETS TWICE A DAY AFTER 1 WEEK, Disp: , Rfl:     Multiple Vitamins-Minerals (MULTIVITAMIN WITH MINERALS) tablet tablet, Take 1 tablet by mouth Daily., Disp: , Rfl:     traZODone (DESYREL) 100 MG tablet, take 2 tablet by mouth every day after meals, Disp: , Rfl:     traZODone (DESYREL) 150 MG tablet, Take 200 mg by mouth Every Night., Disp: , Rfl:     tiZANidine (ZANAFLEX) 4 MG tablet, Take 1 tablet by mouth 2 (Two) Times a Day As Needed for Muscle Spasms for up to 60 days., Disp: 60 tablet, Rfl: 1    traMADol (ULTRAM) 50 MG tablet, Take 1 tablet by mouth 3 (Three) Times a Day As Needed for Moderate Pain for up to 30 days., Disp: 90 tablet, Rfl: 0    The following portions of the patient's history were reviewed and updated as appropriate: allergies, current medications, past family history, past medical history, past social history, past surgical history, and problem list.      REVIEW OF PERTINENT MEDICAL DATA    Past Medical History:   Diagnosis Date    Back pain     Kidney  stone     Neck pain      Past Surgical History:   Procedure Laterality Date    SHOULDER SURGERY       Family History   Problem Relation Age of Onset    No Known Problems Mother     Heart attack Father      Social History     Socioeconomic History    Marital status:    Tobacco Use    Smoking status: Every Day     Packs/day: 0.50     Types: Cigarettes    Smokeless tobacco: Never   Vaping Use    Vaping Use: Never used   Substance and Sexual Activity    Alcohol use: Not Currently     Comment: occassionally    Drug use: Yes     Types: Marijuana     Comment: occassionally. Patient states he do not do any drugs    Sexual activity: Defer         Review of Systems   Musculoskeletal:  Positive for back pain.       Vitals:    10/02/23 0814   BP: 135/91   Pulse: 77   Resp: 16   SpO2: 96%   PainSc:   7         Objective   Physical Exam  Neck:     Musculoskeletal:         General: Tenderness present.        Back:    Neurological:      Deep Tendon Reflexes:      Reflex Scores:       Tricep reflexes are 2+ on the right side and 2+ on the left side.       Bicep reflexes are 2+ on the right side and 2+ on the left side.       Brachioradialis reflexes are 2+ on the right side and 2+ on the left side.       Patellar reflexes are 2+ on the right side and 2+ on the left side.       Achilles reflexes are 2+ on the right side and 2+ on the left side.     Comments: Motor strength 5/5 b/l LE, UE  Sensory intact b/l LE, UE           Imaging Reviewed:  Imaging Reviewed:  CT cervical spine without contrast-2/21/2023  - Multilevel spondylosis with areas of disc osteophyte complex formation  - Facet arthropathy at multiple levels most most notable at C6-7.  - Limited spinal canal and neural foraminal evaluation with likely mild to moderate stenosis present.     Lumbar x-ray-5/10/2023  - Disc space narrowing at L5-S1.  - Grade 1 anterolisthesis of L5 on S1 with bilateral pars intercularis defect     X-ray thoracic spine-1/1/2021  - No  acute findings     X-ray cervical spine-1/1/2021  - Cervical spondylosis at C5-C6 and C6-7.     CT abdominal pelvis without IV contrast-10/5/2022  - Small nonobstructing colliculi in left kidney.  Largest is in the upper to mid left kidney and measures approximately 3 to 4 mm.       Assessment:    1. Sacroiliac joint dysfunction    2. Pars defect with spondylolisthesis    3. Cervical spondylosis    4. High risk medication use       Plan:   1. UDS on 6/1/23 consistent with patient interview and positive for tramadol.   Narcotic contract signed on 6/1/2023.  Point-of-care negative for any nonprescribed substances.  Patient had alcohol issues in the past.  Discussed with patient that he is at significantly higher risk of addiction with pain medications and I do not recommend pain medications for longer period of time.  2. We discussed trying a course of formal physical therapy.  Physical therapy can help strengthen and stretch the muscles around the joints. Continue to be as active as possible. Start physical therapy as it will help generalized pain and follow up with HEP.  PT referral sent to [facility] - 6/1/23.  Assigned home exercises program to be started on 6/1/2023.  3.  Patient has failed Tylenol, ibuprofen, meloxicam.   4.  Continue Tizanidine 4 mg qhs PRN. Side effect profile discussed with the patient including but not limited to transient drowsiness, dizziness, weakness, fatigue, confusion, headache, insomnia, nausea, constipation and urinary frequency. Patient understands and agrees.  5.  Only for severe pain, continue tramadol 50 mg TID PRN (10/2/23). Discussed with the patient regarding long-term side effects of opioids including but not limited to opioid induced hormonal suppression, hyperalgesia, fatigue, weight gain, possible opioid induced altered immune system, addiction, tolerance, dependence, risk of hearing loss and elevated risk of myocardial infarction. Proper use and potential life threatening  side effects of over use discussed with patient. Patient states understanding of their use and risks.  6.  Excellent relief with diagnostic SI joint injections, but pain is returning. Reviewed lumbar x-ray with patient.  Lumbar x-ray shows pars interarticularis defect at L5 with anterolisthesis grade 1.  Patient is pointing his pain at the SI joints bilaterally but more left than right side. Patient has pain in the lower back,  b/l SI joint tenderness was positive. Guerline test, SI joint compression and thigh thurst test were performed and were positive for SI joint pathology. Discussed therapeutic B/l SI joint injection under fluoro, Discussed the possibility of infection, bleeding, nerve damage, headache, increased pain, paraplegia. Patient understands and agrees. We discussed long term solution of SI joints if injections don't last long enough including SI joint RFA vs fusion. He is leaning more toward SI joint RFA.   7.  Discussed cervical spine x-ray and cervical CT scan.  CT of cervical spine shows facet arthritis most notably at C6-C7 level.  Patient has bilateral facet tenderness and facet loading positive bilateral C4-C7.  Discussed with patient that we may consider bilateral MBB C5-C7 in future if pain does not improve with conservative management.  Patient understands and agrees with the plan.        RTC for SI joint injections and then 3 weeks follow up.      Juan David Dior DO  Pain Management   [facility]        INSPECT REPORT    As part of the patient's treatment plan, I may be prescribing controlled substances. The patient has been made aware of appropriate use of such medications, including potential risk of somnolence, limited ability to drive and/or work safely, and the potential for dependence or overdose. It has also been made clear that these medications are for use by this patient only, without concomitant use of alcohol or other substances unless prescribed.     Patient has completed  prescribing agreement detailing terms of continued prescribing of controlled substances, including monitoring INSPECT reports, urine drug screening, and pill counts if necessary. The patient is aware that inappropriate use will results in cessation of prescribing such medications.    INSPECT report has been reviewed and scanned into the patient's chart.

## 2023-10-02 ENCOUNTER — OFFICE VISIT (OUTPATIENT)
Dept: PAIN MEDICINE | Facility: CLINIC | Age: 43
End: 2023-10-02
Payer: MEDICAID

## 2023-10-02 VITALS
SYSTOLIC BLOOD PRESSURE: 135 MMHG | HEART RATE: 77 BPM | OXYGEN SATURATION: 96 % | DIASTOLIC BLOOD PRESSURE: 91 MMHG | RESPIRATION RATE: 16 BRPM

## 2023-10-02 DIAGNOSIS — Z79.899 HIGH RISK MEDICATION USE: ICD-10-CM

## 2023-10-02 DIAGNOSIS — M43.10 PARS DEFECT WITH SPONDYLOLISTHESIS: ICD-10-CM

## 2023-10-02 DIAGNOSIS — M53.3 SACROILIAC JOINT DYSFUNCTION: Primary | ICD-10-CM

## 2023-10-02 DIAGNOSIS — M47.812 CERVICAL SPONDYLOSIS: ICD-10-CM

## 2023-10-02 RX ORDER — TIZANIDINE 4 MG/1
4 TABLET ORAL 2 TIMES DAILY PRN
Qty: 60 TABLET | Refills: 1 | Status: SHIPPED | OUTPATIENT
Start: 2023-10-02 | End: 2023-12-01

## 2023-10-02 RX ORDER — TRAMADOL HYDROCHLORIDE 50 MG/1
50 TABLET ORAL 3 TIMES DAILY PRN
Qty: 90 TABLET | Refills: 0 | Status: SHIPPED | OUTPATIENT
Start: 2023-10-02 | End: 2023-11-01

## 2023-10-05 ENCOUNTER — HOSPITAL ENCOUNTER (OUTPATIENT)
Dept: PAIN MEDICINE | Facility: HOSPITAL | Age: 43
Discharge: HOME | End: 2023-10-05
Payer: MEDICAID

## 2023-10-05 VITALS
OXYGEN SATURATION: 96 % | TEMPERATURE: 97.3 F | HEIGHT: 69 IN | BODY MASS INDEX: 26.66 KG/M2 | WEIGHT: 180 LBS | HEART RATE: 78 BPM | RESPIRATION RATE: 16 BRPM | DIASTOLIC BLOOD PRESSURE: 90 MMHG | SYSTOLIC BLOOD PRESSURE: 127 MMHG

## 2023-10-05 DIAGNOSIS — M53.3 SACROILIAC JOINT DYSFUNCTION: Primary | ICD-10-CM

## 2023-10-05 DIAGNOSIS — R52 PAIN: ICD-10-CM

## 2023-10-05 PROCEDURE — 77003 FLUOROGUIDE FOR SPINE INJECT: CPT

## 2023-10-05 PROCEDURE — 25010000002 METHYLPREDNISOLONE PER 80 MG: Performed by: STUDENT IN AN ORGANIZED HEALTH CARE EDUCATION/TRAINING PROGRAM

## 2023-10-05 PROCEDURE — 25010000002 BUPIVACAINE (PF) 0.25 % SOLUTION: Performed by: STUDENT IN AN ORGANIZED HEALTH CARE EDUCATION/TRAINING PROGRAM

## 2023-10-05 RX ORDER — BUPIVACAINE HYDROCHLORIDE 2.5 MG/ML
10 INJECTION, SOLUTION EPIDURAL; INFILTRATION; INTRACAUDAL ONCE
Status: COMPLETED | OUTPATIENT
Start: 2023-10-05 | End: 2023-10-05

## 2023-10-05 RX ORDER — METHYLPREDNISOLONE ACETATE 80 MG/ML
80 INJECTION, SUSPENSION INTRA-ARTICULAR; INTRALESIONAL; INTRAMUSCULAR; SOFT TISSUE ONCE
Status: COMPLETED | OUTPATIENT
Start: 2023-10-05 | End: 2023-10-05

## 2023-10-05 RX ORDER — LIDOCAINE HYDROCHLORIDE 10 MG/ML
5 INJECTION, SOLUTION EPIDURAL; INFILTRATION; INTRACAUDAL; PERINEURAL ONCE
Status: COMPLETED | OUTPATIENT
Start: 2023-10-05 | End: 2023-10-05

## 2023-10-05 RX ADMIN — METHYLPREDNISOLONE ACETATE 80 MG: 80 INJECTION, SUSPENSION INTRA-ARTICULAR; INTRALESIONAL; INTRAMUSCULAR; SOFT TISSUE at 08:12

## 2023-10-05 RX ADMIN — LIDOCAINE HYDROCHLORIDE 5 ML: 10 INJECTION, SOLUTION EPIDURAL; INFILTRATION; INTRACAUDAL; PERINEURAL at 08:10

## 2023-10-05 RX ADMIN — BUPIVACAINE HYDROCHLORIDE 10 ML: 2.5 INJECTION, SOLUTION EPIDURAL; INFILTRATION; INTRACAUDAL; PERINEURAL at 08:12

## 2023-10-05 NOTE — PROCEDURES
PREOPERATIVE DIAGNOSIS:    B/l SI Joint Arthropathy      POSTOPERATIVE DIAGNOSIS:  Same.     PROCEDURE: b/l sacroiliac joint steroid injection     PROCEDURE IN DETAIL:  The patient was placed in a prone position and the lower back was prepped with chloraprep and draped in the usual sterile fashion.  The skin overlaying the left SI joint was infiltrated with 1% lidocaine for local anesthesia.  A 22-gauge 3.5 inch spinal needle was inserted through the skin under fluoroscopic guidance until we got to the lower third of the SI joint. Another needle was placed in ryan upper third of the joint. 2 cc of 0.25% marcaine with depo-medrol was injected at each level. Same steps were repeated on right side. A total of 8 cc of 0.25% marcaine with 80 mg of depo-medrol was injected.     After the procedure the needles were flushed with preservative free local anesthetic and removed. Skin was cleaned and a sterile dressing was applied.    Following the procedure the patient's vital signs were stable. The patient was discharged home in good condition after being given discharge instructions.    COMPLICATIONS: None    Juan David Dior DO  Pain Management   [facility]

## 2023-10-05 NOTE — H&P
H and P reviewed from previous visit and no changes to patient's clinical presentation. Will proceed with procedure as planned. Patient denies history of DM and being on blood thinners.    Juan David Dior DO  Pain Management   [facility]

## 2023-10-06 ENCOUNTER — TELEPHONE (OUTPATIENT)
Dept: PAIN MEDICINE | Facility: HOSPITAL | Age: 43
End: 2023-10-06
Payer: MEDICAID

## 2023-10-06 NOTE — TELEPHONE ENCOUNTER
Post procedure call made, patient reports being sore today. He rates his pain level a #8. I did advise to continue ice and can rotate to heat tomorrow if needed. Patient will call with any on going concerns.

## 2023-10-09 ENCOUNTER — TELEPHONE (OUTPATIENT)
Dept: PAIN MEDICINE | Facility: CLINIC | Age: 43
End: 2023-10-09

## 2023-10-09 DIAGNOSIS — M53.3 SACROILIAC JOINT DYSFUNCTION: Primary | ICD-10-CM

## 2023-10-09 RX ORDER — METHYLPREDNISOLONE 4 MG/1
TABLET ORAL
Qty: 21 TABLET | Refills: 0 | Status: SHIPPED | OUTPATIENT
Start: 2023-10-09

## 2023-10-09 NOTE — TELEPHONE ENCOUNTER
Patient notified.   PROCEDURES:  Exploratory laparotomy 12-Jun-2023 07:00:17  Susan Carter  Abdominoplasty, with ventral hernia repair 12-Jun-2023 07:01:16  Susan Carter

## 2023-10-09 NOTE — TELEPHONE ENCOUNTER
Caller: Sergey Jason     Relationship: SELF    Best call back number:     What is your medical concern? THE PATIENT IS HAVING A LOT OF LOWER BACK PAIN NEAR THE SPINE    How long has this issue been going on? IT HAS BEEN SORE SINCE HIS INJECTION ON 10/5/23 BUT HAS GOTTEN WORSE WITHIN THE LAST 48 HOURS    Is your provider already aware of this issue? NO    Have you been treated for this issue? NO

## 2023-10-09 NOTE — TELEPHONE ENCOUNTER
Recommend giving  1 more week. Will send Steroid pack to take for next 5 days. Continue Ice/heat.     Juan David Dior DO  Pain Management   [facility]

## 2023-10-09 NOTE — TELEPHONE ENCOUNTER
Patient has been using ice/heat Tramadol is not even touching the pain. He states he had this done before and it was not like this.He thinks something is wrong.

## 2023-10-18 ENCOUNTER — OFFICE VISIT (OUTPATIENT)
Dept: PAIN MEDICINE | Facility: CLINIC | Age: 43
End: 2023-10-18
Payer: MEDICAID

## 2023-10-18 VITALS
HEART RATE: 99 BPM | SYSTOLIC BLOOD PRESSURE: 141 MMHG | RESPIRATION RATE: 16 BRPM | OXYGEN SATURATION: 96 % | DIASTOLIC BLOOD PRESSURE: 90 MMHG

## 2023-10-18 DIAGNOSIS — M53.3 SACROILIAC JOINT DYSFUNCTION: ICD-10-CM

## 2023-10-18 DIAGNOSIS — M47.812 CERVICAL SPONDYLOSIS: ICD-10-CM

## 2023-10-18 DIAGNOSIS — M47.816 LUMBAR SPONDYLOSIS: Primary | ICD-10-CM

## 2023-10-18 DIAGNOSIS — Z79.899 HIGH RISK MEDICATION USE: ICD-10-CM

## 2023-10-18 DIAGNOSIS — M43.10 PARS DEFECT WITH SPONDYLOLISTHESIS: ICD-10-CM

## 2023-10-18 RX ORDER — GABAPENTIN 300 MG/1
300 CAPSULE ORAL
Qty: 30 CAPSULE | Refills: 0 | Status: SHIPPED | OUTPATIENT
Start: 2023-10-18 | End: 2023-11-17

## 2023-10-18 RX ORDER — HYDROCODONE BITARTRATE AND ACETAMINOPHEN 5; 325 MG/1; MG/1
1 TABLET ORAL EVERY 6 HOURS PRN
Qty: 28 TABLET | Refills: 0 | Status: SHIPPED | OUTPATIENT
Start: 2023-10-18 | End: 2023-10-25

## 2023-10-18 NOTE — PROGRESS NOTES
Subjective   Sergey Jason is a 43 y.o. male is here for follow up for lower back pain and neck pain.  Last seen for bilateral SI joint injections.  Patient states that he had increased pain since the injections especially on the right side radiating all the way to the his mid back.  He was placed on Medrol Dosepak for 5 days and also was given tramadol without any significant pain relief.  Trouble sleeping at nighttime as well.  Denies any fever chills sore any signs of infection at the sites of injection.    On last visit:        Lower back pain is 7/10 on VAS, at maximum is 10/10. Pain is aching, throbbing, sharp, spasms in nature. Pain is referred to b/l buttocks. The pain is constant. The pain is improved by SI joint injections. The pain is worse with everything. Pain is effecting her abilities to daily activities, mood, sleep.      Neck pain is 5/10 on VAS, at maximum is 8/10. Pain is tightness, sharp in nature. Pain is referred b/l trapezius. The pain is constnat. The pain is improved by massages. The pain is worse with sitting for long time. +headache from left occipital region to top of his head.     Previous Injection:   10/5/23 - b/l SI joint injection -   7/28/2023-bilateral SI joint injections - 95% pain relief with diagnostic block; 95% pain relief for 2 months. Able to play golf, work out, didn't need pain meds.     Hx: Referred by Dr. Tidwell, Angel Jimenez II, DO for lower back pain and neck pain. Pain started about 3 months ago without any inciting events. Has neck pain and lower back currently.   He has tried ibuprofen, baclofen, Medrol Dosepak, hydrocodone, tramadol without significant pain relief.  He was started on tizanidine and tramadol by Dr. Tidwell on 5/19/2023. Patient has been referred to neurology, but can't see till 8/23.       PHQ-9- 6                       SOAPP- 13 - struggled with EtoH in past   [place] back disability scale - 27     PMH:   Kidney stones, Left shoulder surgeryX2,  smoker     Current Medications:   Tizanidine 4 mg   Tramadol 50 mg TID PRN  Trazodone        Past Medications:  Tramadol 50 mg - felt like taking ibuprofen  Tylenol   Ibuprofen  Medrol dose pack  Baclofen - didn't help      Past Modalities:  TENS:                                                                          no                                                  Physical Therapy Within The Last 6 Months              yes - [facility] since 6/13/23 -8 weeks-no significant improvement.  Pain worsened  Psychotherapy                                                            no  Massage Therapy                                                       no     Patient Complains Of:  Uro-Fecal Incontinence          no  Weight Gain/Loss                   no  Fever/Chills                             no  Weakness                               no        PEG Assessment   What number best describes your pain on average in the past week?8  What number best describes how, during the past week, pain has interfered with your enjoyment of life?8  What number best describes how, during the past week, pain has interfered with your general activity?  8             Current Outpatient Medications:     busPIRone (BUSPAR) 15 MG tablet, TAKE 1 TABLET BY MOUTH TWO TIMES A DAY. MAY INCREASE TO TAKE 2 TABLETS TWICE A DAY AFTER 1 WEEK, Disp: , Rfl:     Multiple Vitamins-Minerals (MULTIVITAMIN WITH MINERALS) tablet tablet, Take 1 tablet by mouth Daily., Disp: , Rfl:     tiZANidine (ZANAFLEX) 4 MG tablet, Take 1 tablet by mouth 2 (Two) Times a Day As Needed for Muscle Spasms for up to 60 days., Disp: 60 tablet, Rfl: 1    traMADol (ULTRAM) 50 MG tablet, Take 1 tablet by mouth 3 (Three) Times a Day As Needed for Moderate Pain for up to 30 days., Disp: 90 tablet, Rfl: 0    traZODone (DESYREL) 100 MG tablet, take 2 tablet by mouth every day after meals, Disp: , Rfl:     traZODone (DESYREL) 150 MG tablet, Take 200 mg by mouth Every Night., Disp: ,  Rfl:     gabapentin (NEURONTIN) 300 MG capsule, Take 1 capsule by mouth every night at bedtime for 30 days., Disp: 30 capsule, Rfl: 0    HYDROcodone-acetaminophen (NORCO) 5-325 MG per tablet, Take 1 tablet by mouth Every 6 (Six) Hours As Needed for Severe Pain for up to 7 days., Disp: 28 tablet, Rfl: 0    methylPREDNISolone (MEDROL) 4 MG dose pack, Take as directed on package instructions. (Patient not taking: Reported on 10/18/2023), Disp: 21 tablet, Rfl: 0    The following portions of the patient's history were reviewed and updated as appropriate: allergies, current medications, past family history, past medical history, past social history, past surgical history, and problem list.      REVIEW OF PERTINENT MEDICAL DATA    Past Medical History:   Diagnosis Date    Back pain     Kidney stone     Neck pain      Past Surgical History:   Procedure Laterality Date    SHOULDER SURGERY       Family History   Problem Relation Age of Onset    No Known Problems Mother     Heart attack Father      Social History     Socioeconomic History    Marital status:    Tobacco Use    Smoking status: Every Day     Packs/day: .5     Types: Cigarettes    Smokeless tobacco: Never   Vaping Use    Vaping Use: Never used   Substance and Sexual Activity    Alcohol use: Not Currently     Comment: occassionally    Drug use: Yes     Types: Marijuana     Comment: occassionally. Patient states he do not do any drugs    Sexual activity: Defer         Review of Systems   Musculoskeletal:  Positive for back pain.         Vitals:    10/18/23 1444   BP: 141/90   Pulse: 99   Resp: 16   SpO2: 96%   PainSc:   8         Objective   Physical Exam  Neck:     Musculoskeletal:         General: Tenderness present.        Back:         Legs:    Neurological:      Deep Tendon Reflexes:      Reflex Scores:       Tricep reflexes are 2+ on the right side and 2+ on the left side.       Bicep reflexes are 2+ on the right side and 2+ on the left side.        Brachioradialis reflexes are 2+ on the right side and 2+ on the left side.       Patellar reflexes are 2+ on the right side and 2+ on the left side.       Achilles reflexes are 2+ on the right side and 2+ on the left side.     Comments: Motor strength 5/5 b/l LE, UE  Sensory intact b/l LE, UE             Imaging Reviewed:  Imaging Reviewed:  CT cervical spine without contrast-2/21/2023  - Multilevel spondylosis with areas of disc osteophyte complex formation  - Facet arthropathy at multiple levels most most notable at C6-7.  - Limited spinal canal and neural foraminal evaluation with likely mild to moderate stenosis present.     Lumbar x-ray-5/10/2023  - Disc space narrowing at L5-S1.  - Grade 1 anterolisthesis of L5 on S1 with bilateral pars intercularis defect     X-ray thoracic spine-1/1/2021  - No acute findings     X-ray cervical spine-1/1/2021  - Cervical spondylosis at C5-C6 and C6-7.     CT abdominal pelvis without IV contrast-10/5/2022  - Small nonobstructing colliculi in left kidney.  Largest is in the upper to mid left kidney and measures approximately 3 to 4 mm.       Assessment:    1. Lumbar spondylosis    2. Sacroiliac joint dysfunction    3. Pars defect with spondylolisthesis    4. Cervical spondylosis    5. High risk medication use       Plan:   1. UDS on 6/1/23 consistent with patient interview and positive for tramadol.   Narcotic contract signed on 6/1/2023.  Point-of-care negative for any nonprescribed substances.  Patient had alcohol issues in the past.  Discussed with patient that he is at significantly higher risk of addiction with pain medications and I do not recommend pain medications for longer period of time.  2. We discussed trying a course of formal physical therapy.  Physical therapy can help strengthen and stretch the muscles around the joints. Continue to be as active as possible. Start physical therapy as it will help generalized pain and follow up with HEP.  PT referral sent to  [facility] - 6/1/23.  Assigned home exercises program to be started on 6/1/2023.  3.  Patient has failed Tylenol, ibuprofen, meloxicam.   4.  Continue Tizanidine 4 mg qhs PRN. Side effect profile discussed with the patient including but not limited to transient drowsiness, dizziness, weakness, fatigue, confusion, headache, insomnia, nausea, constipation and urinary frequency. Patient understands and agrees.  5.  Only for severe pain, given Norco 5-325 mg for 28 tablets (10/18/23).  Discussed with patient that he is at high risk due to alcohol abuse in the past and would like to limit narcotics for short-term.  Discussed with the patient regarding long-term side effects of opioids including but not limited to opioid induced hormonal suppression, hyperalgesia, fatigue, weight gain, possible opioid induced altered immune system, addiction, tolerance, dependence, risk of hearing loss and elevated risk of myocardial infarction. Proper use and potential life threatening side effects of over use discussed with patient. Patient states understanding of their use and risks.  6.  Discussed cervical spine x-ray and cervical CT scan.  CT of cervical spine shows facet arthritis most notably at C6-C7 level.  Patient has bilateral facet tenderness and facet loading positive bilateral C4-C7.  Discussed with patient that we may consider bilateral MBB C5-C7 in future if pain does not improve with conservative management.  Patient understands and agrees with the plan.  7.  Unfortunately no significant pain relief with second SI joint injection and he has slightly worsened pain.  We will obtain lumbar MRI without contrast rule out any significant stenosis and lower back.  After reviewing MRI we may consider LESI versus lower level MBB.        We will give him a call after lumbar MRI.    Juan David Dior DO  Pain Management   [facility]        INSPECT REPORT    As part of the patient's treatment plan, I may be prescribing controlled  substances. The patient has been made aware of appropriate use of such medications, including potential risk of somnolence, limited ability to drive and/or work safely, and the potential for dependence or overdose. It has also been made clear that these medications are for use by this patient only, without concomitant use of alcohol or other substances unless prescribed.     Patient has completed prescribing agreement detailing terms of continued prescribing of controlled substances, including monitoring INSPECT reports, urine drug screening, and pill counts if necessary. The patient is aware that inappropriate use will results in cessation of prescribing such medications.    INSPECT report has been reviewed and scanned into the patient's chart.

## 2023-10-19 ENCOUNTER — TELEPHONE (OUTPATIENT)
Dept: PAIN MEDICINE | Facility: CLINIC | Age: 43
End: 2023-10-19

## 2023-10-19 NOTE — TELEPHONE ENCOUNTER
Caller: Sergey Jaosn    Relationship: Self    Best call back number: [phone]    What orders are you requesting (i.e. lab or imaging): MRI LOWER BACK AND SPINE AREA     In what timeframe would the patient need to come in: ASAP    Where will you receive your lab/imaging services: [facility] FAX: [phone]    Additional notes: PLEASE FAX ORDER TO THIS FACILITY FOR SCHEDULING. PLEASE CALL WITH ANY QUESTIONS. OK TO LEAVE A VOICEMAIL.

## 2023-10-27 ENCOUNTER — TELEPHONE (OUTPATIENT)
Dept: PAIN MEDICINE | Facility: CLINIC | Age: 43
End: 2023-10-27
Payer: MEDICAID

## 2023-10-27 DIAGNOSIS — M43.10 PARS DEFECT WITH SPONDYLOLISTHESIS: Primary | ICD-10-CM

## 2023-10-27 DIAGNOSIS — M51.36 DDD (DEGENERATIVE DISC DISEASE), LUMBAR: ICD-10-CM

## 2023-10-27 NOTE — TELEPHONE ENCOUNTER
MRI reviewed and was personally discussed with patient.  He continues to have bilateral buttock pain and right side worse than left side.    MRI lumbar spine without contrast-10/27/2023  - Grade 1 chronic L5 spondylosis with grade 1 spondylolisthesis at L5-S1.  - Moderate right neuroforaminal stenosis at this level with potential contact of the exiting right L5 nerve root.  L4-5-mild disc bulge with osteophyte formation.    Patient has pain in the lower back with referred pain in the leg, patient has failed conservative therapy including PT and pharmacological management for more than 6 weeks and pain interferes with activities of daily living. Discussed lumbar EFREN L5-S1 (right sided). Discussed the possibility of infection, bleeding, nerve damage, post dural puncture headache, increased pain, paraplegia. Patient understands and agrees.     Juan David Dior DO  Pain Management   [facility]

## 2023-10-30 ENCOUNTER — TELEPHONE (OUTPATIENT)
Dept: PAIN MEDICINE | Facility: CLINIC | Age: 43
End: 2023-10-30
Payer: MEDICAID

## 2023-10-30 DIAGNOSIS — M43.10 PARS DEFECT WITH SPONDYLOLISTHESIS: Primary | ICD-10-CM

## 2023-10-30 DIAGNOSIS — M51.36 DDD (DEGENERATIVE DISC DISEASE), LUMBAR: ICD-10-CM

## 2023-10-30 RX ORDER — HYDROCODONE BITARTRATE AND ACETAMINOPHEN 5; 325 MG/1; MG/1
1 TABLET ORAL EVERY 6 HOURS PRN
Qty: 28 TABLET | Refills: 0 | Status: SHIPPED | OUTPATIENT
Start: 2023-10-30 | End: 2023-11-06

## 2023-10-30 NOTE — TELEPHONE ENCOUNTER
SCHEDULED LESI FOR 11/16 TAKES 10 BUSINESS DAYS TO GET APPROVED THROUGH INSURANCE. PATIENT NEEDS ADDITIONAL MEDICATION TO GET HIM THROUGH TIL THAT TIME STATED PAIN IS SO BAD HE CAN HARDLY SLEEP AT NIGHT.

## 2023-11-16 ENCOUNTER — HOSPITAL ENCOUNTER (OUTPATIENT)
Dept: PAIN MEDICINE | Facility: HOSPITAL | Age: 43
Discharge: HOME | End: 2023-11-16
Payer: MEDICAID

## 2023-11-16 VITALS
OXYGEN SATURATION: 98 % | BODY MASS INDEX: 26.66 KG/M2 | SYSTOLIC BLOOD PRESSURE: 126 MMHG | TEMPERATURE: 97.1 F | HEIGHT: 69 IN | RESPIRATION RATE: 16 BRPM | WEIGHT: 180 LBS | DIASTOLIC BLOOD PRESSURE: 90 MMHG | HEART RATE: 70 BPM

## 2023-11-16 DIAGNOSIS — M51.36 DDD (DEGENERATIVE DISC DISEASE), LUMBAR: Primary | ICD-10-CM

## 2023-11-16 DIAGNOSIS — R52 PAIN: ICD-10-CM

## 2023-11-16 PROCEDURE — 77003 FLUOROGUIDE FOR SPINE INJECT: CPT

## 2023-11-16 PROCEDURE — 25010000002 METHYLPREDNISOLONE PER 80 MG: Performed by: STUDENT IN AN ORGANIZED HEALTH CARE EDUCATION/TRAINING PROGRAM

## 2023-11-16 PROCEDURE — 25510000001 IOPAMIDOL 41 % SOLUTION: Performed by: STUDENT IN AN ORGANIZED HEALTH CARE EDUCATION/TRAINING PROGRAM

## 2023-11-16 RX ORDER — IOPAMIDOL 408 MG/ML
3 INJECTION, SOLUTION INTRATHECAL
Status: COMPLETED | OUTPATIENT
Start: 2023-11-16 | End: 2023-11-16

## 2023-11-16 RX ORDER — TRAZODONE HYDROCHLORIDE 300 MG/1
300 TABLET ORAL DAILY
COMMUNITY
Start: 2023-10-25

## 2023-11-16 RX ORDER — METHYLPREDNISOLONE ACETATE 80 MG/ML
80 INJECTION, SUSPENSION INTRA-ARTICULAR; INTRALESIONAL; INTRAMUSCULAR; SOFT TISSUE ONCE
Status: COMPLETED | OUTPATIENT
Start: 2023-11-16 | End: 2023-11-16

## 2023-11-16 RX ORDER — HYDROCODONE BITARTRATE AND ACETAMINOPHEN 5; 325 MG/1; MG/1
1 TABLET ORAL EVERY 6 HOURS PRN
Qty: 28 TABLET | Refills: 0 | Status: SHIPPED | OUTPATIENT
Start: 2023-11-16 | End: 2023-11-23

## 2023-11-16 RX ORDER — TIZANIDINE 4 MG/1
4 TABLET ORAL 2 TIMES DAILY PRN
Qty: 60 TABLET | Refills: 1 | Status: SHIPPED | OUTPATIENT
Start: 2023-11-16 | End: 2024-01-15

## 2023-11-16 RX ADMIN — IOPAMIDOL 3 ML: 408 INJECTION, SOLUTION INTRATHECAL at 10:24

## 2023-11-16 RX ADMIN — METHYLPREDNISOLONE ACETATE 80 MG: 80 INJECTION, SUSPENSION INTRA-ARTICULAR; INTRALESIONAL; INTRAMUSCULAR; SOFT TISSUE at 10:24

## 2023-11-16 NOTE — H&P
H and P reviewed from previous visit and no changes to patient's clinical presentation. Will proceed with procedure as planned. Patient denies history of DM and being on blood thinners.    Juan David Dior DO  Pain Management   [facility]

## 2023-11-16 NOTE — PROCEDURES
PREOPERATIVE DIAGNOSIS:    1. Lumbar DDD    POSTOPERATIVE DIAGNOSIS: Same    PROCEDURE:  Lumbar epidural steroid injection L5-S1    PROCEDURE NOTE:  After obtaining written informed consent patient was taken to the procedure room. Pre-procedure blood pressure and pulse were stable and recorded in patients clinic chart.     The patient was placed in the prone position. The lower back was prepped with antiseptic solution and draped in the usual sterile fashion.  The skin over the L5-S1 space was identified under fluoroscopic guidance and infiltrated with 1% lidocaine for local anesthesia via 25 gauge needle.  A 20-gauge tuohy needle was used to access the epidural space using loss of resistance to air technique. Following negative aspiration, 2 cc of the omnipaque dye was injected.  There was good spread of the dye from L3-L5 area. A mixture containing  3 ml of saline with 80 mg of depo-medrol was injected. There was no evidence of CSF, paresthesia or vascular spread. The needle was removed. Skin was cleaned and band aid was applied.    Following the procedure the patient's vital signs were stable. The patient was discharged home in good condition after being given discharge instructions.    COMPLICATIONS: None    Juan David Dior DO  Pain Management   [facility]

## 2023-11-16 NOTE — DISCHARGE INSTRUCTIONS

## 2023-11-17 ENCOUNTER — TELEPHONE (OUTPATIENT)
Dept: PAIN MEDICINE | Facility: HOSPITAL | Age: 43
End: 2023-11-17
Payer: MEDICAID

## 2023-11-17 DIAGNOSIS — M43.10 PARS DEFECT WITH SPONDYLOLISTHESIS: ICD-10-CM

## 2023-11-17 DIAGNOSIS — M53.3 SACROILIAC JOINT DYSFUNCTION: ICD-10-CM

## 2023-11-17 DIAGNOSIS — M47.812 CERVICAL SPONDYLOSIS: ICD-10-CM

## 2023-11-17 DIAGNOSIS — Z79.899 HIGH RISK MEDICATION USE: ICD-10-CM

## 2023-11-17 RX ORDER — GABAPENTIN 300 MG/1
300 CAPSULE ORAL
Qty: 30 CAPSULE | Refills: 0 | Status: SHIPPED | OUTPATIENT
Start: 2023-11-17 | End: 2023-12-17

## 2023-11-17 NOTE — TELEPHONE ENCOUNTER
Post procedure phone call completed.  Pt states they are doing good and denies questions or concerns.  Patient reports pain level a #6.

## 2023-11-17 NOTE — TELEPHONE ENCOUNTER
Caller: PATIENT    Relationship: SELF     Best call back number: [phone]    Requested Prescriptions: GABAPENTIN (PATIENT IS UNSURE ON THE MG.)    Pharmacy where request should be sent:  [facility] [place], [place]     Last office visit with prescribing clinician: 10/18/2023   Last telemedicine visit with prescribing clinician: Visit date not found   Next office visit with prescribing clinician: 11/27/2023     Additional details provided by patient: PATIENT WAS CALLING TO REQUEST A REFILL ON HIS PRESCRIPTION FOR GABAPENTIN. PATIENT WILL BE OUT OF MEDICATION AFTER TODAY.     Does the patient have less than a 3 day supply:  [x] Yes  [] No    Would you like a call back once the refill request has been completed: [] Yes [x] No    If the office needs to give you a call back, can they leave a voicemail: [] Yes [x] No

## 2023-11-29 NOTE — PROGRESS NOTES
Subjective   Sergey Jason is a 43 y.o. male is here for follow up for lower back pain and neck pain.  Last seen for epidura without significant improvement in pain. He is also struggling through kidney stone pain so hard to tell if epidural really helped.     On last visit:        Lower back pain is 6/10 on VAS, at maximum is 10/10. Pain is aching, throbbing, sharp, spasms in nature. Pain is referred to b/l buttocks. The pain is constant. The pain is improved by SI joint injections. The pain is worse with everything. Pain is effecting her abilities to daily activities, mood, sleep.      Neck pain is 5/10 on VAS, at maximum is 8/10. Pain is tightness, sharp in nature. Pain is referred b/l trapezius. The pain is constnat. The pain is improved by massages. The pain is worse with sitting for long time. +headache from left occipital region to top of his head.     Previous Injection:   11/16/2023-LESI L5-S1- mild relief  10/5/23 - b/l SI joint injection -no pain relief.  7/28/2023-bilateral SI joint injections - 95% pain relief with diagnostic block; 95% pain relief for 2 months. Able to play golf, work out, didn't need pain meds.     Hx: Referred by Dr. Tidwell, [facility]  for lower back pain and neck pain. Pain started about 3 months ago without any inciting events. Has neck pain and lower back currently.   He has tried ibuprofen, baclofen, Medrol Dosepak, hydrocodone, tramadol without significant pain relief.  He was started on tizanidine and tramadol by Dr. Tidwell on 5/19/2023. Patient has been referred to neurology, but can't see till 8/23.       PHQ-9- 6                       SOAPP- 13 - struggled with EtoH in past   [place] back disability scale - 27     PMH:   Kidney stones, Left shoulder surgeryX2, smoker     Current Medications:   Tizanidine 4 mg   Tramadol 50 mg TID PRN  Trazodone        Past Medications:  Tramadol 50 mg - felt like taking ibuprofen  Tylenol   Ibuprofen  Medrol dose pack  Baclofen -  didn't help      Past Modalities:  TENS:                                                                          no                                                  Physical Therapy Within The Last 6 Months              yes - [facility] since 6/13/23 -8 weeks-no significant improvement.  Pain worsened  Psychotherapy                                                            no  Massage Therapy                                                       no     Patient Complains Of:  Uro-Fecal Incontinence          no  Weight Gain/Loss                   no  Fever/Chills                             no  Weakness                               no        PEG Assessment   What number best describes your pain on average in the past week?8  What number best describes how, during the past week, pain has interfered with your enjoyment of life?8  What number best describes how, during the past week, pain has interfered with your general activity?  8             Current Outpatient Medications:     busPIRone (BUSPAR) 15 MG tablet, TAKE 1 TABLET BY MOUTH TWO TIMES A DAY. MAY INCREASE TO TAKE 2 TABLETS TWICE A DAY AFTER 1 WEEK, Disp: , Rfl:     gabapentin (NEURONTIN) 300 MG capsule, Take 1 capsule by mouth every night at bedtime for 30 days., Disp: 30 capsule, Rfl: 0    HYDROcodone-acetaminophen (NORCO) 5-325 MG per tablet, Take 1 tablet by mouth Every 8 (Eight) Hours As Needed. for pain, Disp: , Rfl:     methylPREDNISolone (MEDROL) 4 MG dose pack, Take as directed on package instructions., Disp: 21 tablet, Rfl: 0    Multiple Vitamins-Minerals (MULTIVITAMIN WITH MINERALS) tablet tablet, Take 1 tablet by mouth Daily., Disp: , Rfl:     tiZANidine (ZANAFLEX) 4 MG tablet, Take 1 tablet by mouth 2 (Two) Times a Day As Needed for Muscle Spasms for up to 60 days., Disp: 60 tablet, Rfl: 1    traZODone (DESYREL) 300 MG tablet, Take 1 tablet by mouth Daily. with food, Disp: , Rfl:     The following portions of the patient's history were reviewed and  updated as appropriate: allergies, current medications, past family history, past medical history, past social history, past surgical history, and problem list.      REVIEW OF PERTINENT MEDICAL DATA    Past Medical History:   Diagnosis Date    Back pain     Kidney stone     Neck pain      Past Surgical History:   Procedure Laterality Date    SHOULDER SURGERY       Family History   Problem Relation Age of Onset    No Known Problems Mother     Heart attack Father      Social History     Socioeconomic History    Marital status:    Tobacco Use    Smoking status: Every Day     Packs/day: .5     Types: Cigarettes    Smokeless tobacco: Never   Vaping Use    Vaping Use: Never used   Substance and Sexual Activity    Alcohol use: Not Currently     Comment: occassionally    Drug use: Yes     Types: Marijuana     Comment: occassionally. Patient states he do not do any drugs    Sexual activity: Defer         Review of Systems   Musculoskeletal:  Positive for back pain.         Vitals:    11/30/23 1436   BP: 123/86   Pulse: 77   Resp: 16   SpO2: 97%   PainSc:   6           Objective   Physical Exam  Neck:     Musculoskeletal:         General: Tenderness present.        Back:         Legs:    Neurological:      Deep Tendon Reflexes:      Reflex Scores:       Tricep reflexes are 2+ on the right side and 2+ on the left side.       Bicep reflexes are 2+ on the right side and 2+ on the left side.       Brachioradialis reflexes are 2+ on the right side and 2+ on the left side.       Patellar reflexes are 2+ on the right side and 2+ on the left side.       Achilles reflexes are 2+ on the right side and 2+ on the left side.     Comments: Motor strength 5/5 b/l LE, UE  Sensory intact b/l LE, UE             Imaging Reviewed:  MRI lumbar spine without contrast-10/27/2023  - Grade 1 chronic L5 spondylosis with grade 1 spondylolisthesis at L5-S1.  - Moderate right neuroforaminal stenosis at this level with potential contact of the  exiting right L5 nerve root.  L4-5-mild disc bulge with osteophyte formation.    CT cervical spine without contrast-2/21/2023  - Multilevel spondylosis with areas of disc osteophyte complex formation  - Facet arthropathy at multiple levels most most notable at C6-7.  - Limited spinal canal and neural foraminal evaluation with likely mild to moderate stenosis present.     Lumbar x-ray-5/10/2023  - Disc space narrowing at L5-S1.  - Grade 1 anterolisthesis of L5 on S1 with bilateral pars intercularis defect     X-ray thoracic spine-1/1/2021  - No acute findings     X-ray cervical spine-1/1/2021  - Cervical spondylosis at C5-C6 and C6-7.     CT abdominal pelvis without IV contrast-10/5/2022  - Small nonobstructing colliculi in left kidney.  Largest is in the upper to mid left kidney and measures approximately 3 to 4 mm.       Assessment:    1. Pars defect with spondylolisthesis    2. Sacroiliac joint dysfunction    3. High risk medication use    4. Cervical spondylosis    5. DDD (degenerative disc disease), lumbar         Plan:   1. UDS on 6/1/23 consistent with patient interview and positive for tramadol.   Narcotic contract signed on 6/1/2023.  Point-of-care negative for any nonprescribed substances.  Patient had alcohol issues in the past.  Discussed with patient that he is at significantly higher risk of addiction with pain medications and I do not recommend pain medications for longer period of time.  2. We discussed trying a course of formal physical therapy.  Physical therapy can help strengthen and stretch the muscles around the joints. Continue to be as active as possible. Start physical therapy as it will help generalized pain and follow up with HEP.  PT referral sent to [facility] - 6/1/23.  Assigned home exercises program to be started on 6/1/2023.  3.  Patient has failed Tylenol, ibuprofen, meloxicam.   4.  Continue Tizanidine 4 mg qhs PRN. Side effect profile discussed with the patient including but not  limited to transient drowsiness, dizziness, weakness, fatigue, confusion, headache, insomnia, nausea, constipation and urinary frequency. Patient understands and agrees.  5.  He would like to go back to Tramadol from [place]. Restart Tramadol 50 mg TID PRN- 1/29/24. Side effects discussed including but not limited to nausea, vomiting, constipation, lightheadedness, dizziness, drowsiness, or headache. This medication may increase serotonin and rarely cause a very serious condition called serotonin syndrome/ toxicity.   6. Continue Gabapentin 300 mg qhs. Side effects discussed with the patient including but not limited to somnolence, dizziness, ataxia, headache, fatigue, blurred vision, cold or flu-like symptoms,delusions, hoarseness, lack or loss of strength, lower back or side pain, swelling of the hands, feet, or lower legs trembling or shaking. Patient understands and agrees with the plan.  6.  Discussed cervical spine x-ray and cervical CT scan.  CT of cervical spine shows facet arthritis most notably at C6-C7 level.  Patient has bilateral facet tenderness and facet loading positive bilateral C4-C7.  Discussed with patient that we may consider bilateral MBB C5-C7 in future if pain does not improve with conservative management.  Patient understands and agrees with the plan.  7.  Patient has mainly AXIAL lower back pain. Patient informed they would likely benefit from a medial branch block on bilateral from L4 to Sa.  MRI shows facet arthritis. Facet tenderness is positive from L4 and Sa. Patient informed the procedure is both therapeutic and diagnostic in nature.  The procedure was described in detail and the risks, benefits and alternatives were discussed with the patient (including but not limited to: bleeding, infection, nerve damage, worsening of pain, CSF leak, inability to perform injection, paralysis, seizures, and death) who agreed to proceed.  Discussed RFA at 70-80 degree for  sec if patient has good  relief from 2 diagnostic MBB.    We will consider this if pain doesn't improve in next 1-2 months.        RTC after 1/12/24.    Juan David Dior DO  Pain Management   [facility]        INSPECT REPORT    As part of the patient's treatment plan, I may be prescribing controlled substances. The patient has been made aware of appropriate use of such medications, including potential risk of somnolence, limited ability to drive and/or work safely, and the potential for dependence or overdose. It has also been made clear that these medications are for use by this patient only, without concomitant use of alcohol or other substances unless prescribed.     Patient has completed prescribing agreement detailing terms of continued prescribing of controlled substances, including monitoring INSPECT reports, urine drug screening, and pill counts if necessary. The patient is aware that inappropriate use will results in cessation of prescribing such medications.    INSPECT report has been reviewed and scanned into the patient's chart.

## 2023-11-30 ENCOUNTER — OFFICE VISIT (OUTPATIENT)
Dept: PAIN MEDICINE | Facility: CLINIC | Age: 43
End: 2023-11-30
Payer: MEDICAID

## 2023-11-30 VITALS
RESPIRATION RATE: 16 BRPM | HEART RATE: 77 BPM | DIASTOLIC BLOOD PRESSURE: 86 MMHG | SYSTOLIC BLOOD PRESSURE: 123 MMHG | OXYGEN SATURATION: 97 %

## 2023-11-30 DIAGNOSIS — M47.812 CERVICAL SPONDYLOSIS: ICD-10-CM

## 2023-11-30 DIAGNOSIS — M53.3 SACROILIAC JOINT DYSFUNCTION: ICD-10-CM

## 2023-11-30 DIAGNOSIS — Z79.899 HIGH RISK MEDICATION USE: ICD-10-CM

## 2023-11-30 DIAGNOSIS — M51.36 DDD (DEGENERATIVE DISC DISEASE), LUMBAR: ICD-10-CM

## 2023-11-30 DIAGNOSIS — M43.10 PARS DEFECT WITH SPONDYLOLISTHESIS: Primary | ICD-10-CM

## 2023-11-30 RX ORDER — HYDROCODONE BITARTRATE AND ACETAMINOPHEN 5; 325 MG/1; MG/1
1 TABLET ORAL EVERY 8 HOURS PRN
COMMUNITY
Start: 2023-11-26

## 2023-11-30 RX ORDER — TIZANIDINE 4 MG/1
4 TABLET ORAL 2 TIMES DAILY PRN
Qty: 60 TABLET | Refills: 1 | Status: SHIPPED | OUTPATIENT
Start: 2023-11-30 | End: 2024-01-29

## 2023-11-30 RX ORDER — GABAPENTIN 300 MG/1
300 CAPSULE ORAL
Qty: 30 CAPSULE | Refills: 1 | Status: SHIPPED | OUTPATIENT
Start: 2023-11-30 | End: 2024-01-29

## 2023-11-30 RX ORDER — TRAMADOL HYDROCHLORIDE 50 MG/1
50 TABLET ORAL EVERY 8 HOURS PRN
Qty: 90 TABLET | Refills: 1 | Status: SHIPPED | OUTPATIENT
Start: 2023-11-30 | End: 2024-01-29

## 2023-12-18 ENCOUNTER — APPOINTMENT (OUTPATIENT)
Dept: GENERAL RADIOLOGY | Facility: HOSPITAL | Age: 43
End: 2023-12-18
Payer: MEDICAID

## 2023-12-18 ENCOUNTER — HOSPITAL ENCOUNTER (EMERGENCY)
Facility: HOSPITAL | Age: 43
Discharge: HOME | End: 2023-12-18
Attending: EMERGENCY MEDICINE | Admitting: EMERGENCY MEDICINE
Payer: MEDICAID

## 2023-12-18 VITALS
DIASTOLIC BLOOD PRESSURE: 98 MMHG | SYSTOLIC BLOOD PRESSURE: 143 MMHG | RESPIRATION RATE: 18 BRPM | HEART RATE: 61 BPM | BODY MASS INDEX: 26.66 KG/M2 | WEIGHT: 180 LBS | HEIGHT: 69 IN | OXYGEN SATURATION: 97 % | TEMPERATURE: 98.5 F

## 2023-12-18 DIAGNOSIS — N17.9 AKI (ACUTE KIDNEY INJURY): ICD-10-CM

## 2023-12-18 DIAGNOSIS — R07.9 CHEST PAIN, UNSPECIFIED TYPE: Primary | ICD-10-CM

## 2023-12-18 DIAGNOSIS — F41.9 ANXIETY: ICD-10-CM

## 2023-12-18 DIAGNOSIS — I10 HYPERTENSION, UNSPECIFIED TYPE: ICD-10-CM

## 2023-12-18 LAB
ALBUMIN SERPL-MCNC: 4.2 G/DL (ref 3.5–5.2)
ALBUMIN/GLOB SERPL: 2.1 G/DL
ALP SERPL-CCNC: 74 U/L (ref 39–117)
ALT SERPL W P-5'-P-CCNC: 70 U/L (ref 1–41)
ANION GAP SERPL CALCULATED.3IONS-SCNC: 5.7 MMOL/L (ref 5–15)
AST SERPL-CCNC: 29 U/L (ref 1–40)
BASOPHILS # BLD AUTO: 0.03 10*3/MM3 (ref 0–0.2)
BASOPHILS NFR BLD AUTO: 0.4 % (ref 0–1.5)
BILIRUB SERPL-MCNC: 0.4 MG/DL (ref 0–1.2)
BUN SERPL-MCNC: 13 MG/DL (ref 6–20)
BUN/CREAT SERPL: 9.9 (ref 7–25)
CALCIUM SPEC-SCNC: 9.3 MG/DL (ref 8.6–10.5)
CHLORIDE SERPL-SCNC: 104 MMOL/L (ref 98–107)
CO2 SERPL-SCNC: 28.3 MMOL/L (ref 22–29)
CREAT SERPL-MCNC: 1.31 MG/DL (ref 0.76–1.27)
DEPRECATED RDW RBC AUTO: 43 FL (ref 37–54)
EGFRCR SERPLBLD CKD-EPI 2021: 69.3 ML/MIN/1.73
EOSINOPHIL # BLD AUTO: 0.16 10*3/MM3 (ref 0–0.4)
EOSINOPHIL NFR BLD AUTO: 2.1 % (ref 0.3–6.2)
ERYTHROCYTE [DISTWIDTH] IN BLOOD BY AUTOMATED COUNT: 12.4 % (ref 12.3–15.4)
GEN 5 2HR TROPONIN T REFLEX: 7 NG/L
GLOBULIN UR ELPH-MCNC: 2 GM/DL
GLUCOSE SERPL-MCNC: 92 MG/DL (ref 65–99)
HCT VFR BLD AUTO: 42.8 % (ref 37.5–51)
HGB BLD-MCNC: 14.4 G/DL (ref 13–17.7)
IMM GRANULOCYTES # BLD AUTO: 0.01 10*3/MM3 (ref 0–0.05)
IMM GRANULOCYTES NFR BLD AUTO: 0.1 % (ref 0–0.5)
LYMPHOCYTES # BLD AUTO: 2.61 10*3/MM3 (ref 0.7–3.1)
LYMPHOCYTES NFR BLD AUTO: 34 % (ref 19.6–45.3)
MCH RBC QN AUTO: 31 PG (ref 26.6–33)
MCHC RBC AUTO-ENTMCNC: 33.6 G/DL (ref 31.5–35.7)
MCV RBC AUTO: 92.2 FL (ref 79–97)
MONOCYTES # BLD AUTO: 0.73 10*3/MM3 (ref 0.1–0.9)
MONOCYTES NFR BLD AUTO: 9.5 % (ref 5–12)
NEUTROPHILS NFR BLD AUTO: 4.13 10*3/MM3 (ref 1.7–7)
NEUTROPHILS NFR BLD AUTO: 53.9 % (ref 42.7–76)
NT-PROBNP SERPL-MCNC: 64.2 PG/ML (ref 0–450)
PLATELET # BLD AUTO: 199 10*3/MM3 (ref 140–450)
PMV BLD AUTO: 9 FL (ref 6–12)
POTASSIUM SERPL-SCNC: 3.5 MMOL/L (ref 3.5–5.2)
PROT SERPL-MCNC: 6.2 G/DL (ref 6–8.5)
RBC # BLD AUTO: 4.64 10*6/MM3 (ref 4.14–5.8)
SODIUM SERPL-SCNC: 138 MMOL/L (ref 136–145)
TROPONIN T DELTA: 1 NG/L
TROPONIN T SERPL HS-MCNC: 6 NG/L
WBC NRBC COR # BLD AUTO: 7.67 10*3/MM3 (ref 3.4–10.8)

## 2023-12-18 PROCEDURE — 83880 ASSAY OF NATRIURETIC PEPTIDE: CPT | Performed by: EMERGENCY MEDICINE

## 2023-12-18 PROCEDURE — 96360 HYDRATION IV INFUSION INIT: CPT

## 2023-12-18 PROCEDURE — 80053 COMPREHEN METABOLIC PANEL: CPT | Performed by: EMERGENCY MEDICINE

## 2023-12-18 PROCEDURE — 71045 X-RAY EXAM CHEST 1 VIEW: CPT

## 2023-12-18 PROCEDURE — 25810000003 SODIUM CHLORIDE 0.9 % SOLUTION: Performed by: EMERGENCY MEDICINE

## 2023-12-18 PROCEDURE — 84484 ASSAY OF TROPONIN QUANT: CPT | Performed by: EMERGENCY MEDICINE

## 2023-12-18 PROCEDURE — 93005 ELECTROCARDIOGRAM TRACING: CPT | Performed by: EMERGENCY MEDICINE

## 2023-12-18 PROCEDURE — 85025 COMPLETE CBC W/AUTO DIFF WBC: CPT | Performed by: EMERGENCY MEDICINE

## 2023-12-18 PROCEDURE — 99284 EMERGENCY DEPT VISIT MOD MDM: CPT

## 2023-12-18 PROCEDURE — 36415 COLL VENOUS BLD VENIPUNCTURE: CPT

## 2023-12-18 RX ORDER — MECLIZINE HYDROCHLORIDE 25 MG/1
25 TABLET ORAL ONCE
Status: COMPLETED | OUTPATIENT
Start: 2023-12-18 | End: 2023-12-18

## 2023-12-18 RX ORDER — SODIUM CHLORIDE 0.9 % (FLUSH) 0.9 %
10 SYRINGE (ML) INJECTION AS NEEDED
Status: DISCONTINUED | OUTPATIENT
Start: 2023-12-18 | End: 2023-12-19 | Stop reason: HOSPADM

## 2023-12-18 RX ORDER — TRAZODONE HYDROCHLORIDE 100 MG/1
TABLET ORAL
COMMUNITY
Start: 2023-12-18

## 2023-12-18 RX ORDER — HYDROXYZINE PAMOATE 25 MG/1
50 CAPSULE ORAL 3 TIMES DAILY PRN
Qty: 15 CAPSULE | Refills: 0 | Status: SHIPPED | OUTPATIENT
Start: 2023-12-18

## 2023-12-18 RX ADMIN — SODIUM CHLORIDE 1000 ML: 9 INJECTION, SOLUTION INTRAVENOUS at 20:52

## 2023-12-18 RX ADMIN — MECLIZINE HYDROCHLORIDE 25 MG: 25 TABLET ORAL at 20:25

## 2023-12-19 LAB
QT INTERVAL: 400 MS
QTC INTERVAL: 434 MS

## 2023-12-19 NOTE — FSED PROVIDER NOTE
Subjective   History of Present Illness  43 yom with h/o anxiety and chronic back pain complains of feeling very anxious over the past few days. He complains of feeling like his heart is pounding but does not specifically describe chest pain or pressure. He reports that he has been off his anxiety medication Buspar for 3 months. He stopped taking it because he did not feel like he needed it. He notes his son  around [place] time and this is always a hard time of year for him. He notes he had a strange dream a few days ago and has not felt right since that time. He denies chest pain or shortness of air in the ER. No cough, fever, abdominal pain, nausea or vomiting. He denies h/o DM. HTN, HLD or CAD. He does have history of smoking. His father had a heart attack at the age of 60.       Review of Systems   Constitutional: Negative.    HENT: Negative.     Respiratory: Negative.     Cardiovascular:  Positive for chest pain and palpitations. Negative for leg swelling.   Psychiatric/Behavioral:  Positive for sleep disturbance. The patient is nervous/anxious.    All other systems reviewed and are negative.      Past Medical History:   Diagnosis Date   • Back pain    • Kidney stone    • Neck pain        Allergies   Allergen Reactions   • Penicillins Unknown (See Comments)     States he is unsure       Past Surgical History:   Procedure Laterality Date   • SHOULDER SURGERY         Family History   Problem Relation Age of Onset   • No Known Problems Mother    • Heart attack Father        Social History     Socioeconomic History   • Marital status:    Tobacco Use   • Smoking status: Every Day     Packs/day: .5     Types: Cigarettes   • Smokeless tobacco: Never   Vaping Use   • Vaping Use: Never used   Substance and Sexual Activity   • Alcohol use: Not Currently     Comment: occassionally   • Drug use: Yes     Types: Marijuana     Comment: occassionally. Patient states he do not do any drugs   • Sexual activity:  Defer           Objective   Physical Exam  Constitutional:       General: He is not in acute distress.     Appearance: He is well-developed. He is not ill-appearing, toxic-appearing or diaphoretic.   HENT:      Head: Normocephalic and atraumatic.   Eyes:      Extraocular Movements: Extraocular movements intact.      Pupils: Pupils are equal, round, and reactive to light.   Cardiovascular:      Rate and Rhythm: Normal rate and regular rhythm.      Heart sounds: Normal heart sounds.   Pulmonary:      Effort: Pulmonary effort is normal.      Breath sounds: Normal breath sounds.   Abdominal:      Palpations: Abdomen is soft.   Musculoskeletal:         General: Normal range of motion.      Cervical back: Normal range of motion and neck supple.      Right lower leg: No tenderness. No edema.      Left lower leg: No tenderness. No edema.   Skin:     General: Skin is warm and dry.      Capillary Refill: Capillary refill takes less than 2 seconds.   Neurological:      General: No focal deficit present.      Mental Status: He is alert.       ECG 12 Lead      Date/Time: 12/19/2023 12:06 AM    Performed by: Mishel Mcdonald MD  Authorized by: Mishel Mcdonald MD  Interpreted by physician  Comparison: compared with previous ECG from 1/18/2023  Rhythm: sinus rhythm  Rate: normal  BPM: 71  Conduction: conduction normal  Clinical impression: normal ECG             ED Course  ED Course as of 12/19/23 0006   Mon Dec 18, 2023   2229 Dicussed test results and treatment plan. Pt is agreeable. Discussed the importance of follow-up with PCP for recheck and Cardiology.  [BM]   2303 Discussed with patient keeping a blood pressure diary this week and take to PCP for follow-up.  [BM]      ED Course User Index  [BM] Mishel Mcdonald MD                                           Medical Decision Making  Exam without evidence of volume overload so doubt heart failure. EKG without signs of active ischemia. Given the timing of pain to ER  presentation,  delta troponin was 1 so doubt NSTEMI. Presentation not consistent with acute PE (Wells low risk, PERC negative), pneumothorax (not visualized on chest xr), thoracic aortic dissection, pericarditis, tamponade, pneumonia (no infectious symptoms, clear chest xr), myocarditis (no recent illness, neg trop). HEART score: 1 so plan to  discharge patient home with PMD follow up.    Amount and/or Complexity of Data Reviewed  Labs: ordered.  Radiology: ordered.  ECG/medicine tests: ordered.    Risk  Prescription drug management.        Final diagnoses:   Chest pain, unspecified type   Anxiety   JEROME (acute kidney injury)   Hypertension, unspecified type       ED Disposition  ED Disposition       ED Disposition   Discharge    Condition   Stable    Comment   --               Jodi Qiu PA  [address] [place] [address] [place] [address] [phone]    Schedule an appointment as soon as possible for a visit on 12/20/2023      [facility]  [address] [place] [id number] [phone]  Schedule an appointment as soon as possible for a visit   re-evaluation         Medication List        New Prescriptions      hydrOXYzine pamoate 25 MG capsule  Commonly known as: VISTARIL  Take 2 capsules by mouth 3 (Three) Times a Day As Needed for Anxiety.               Where to Get Your Medications        These medications were sent to [facility]/pharmacy #[id number] - [place], [place] - [address] [place] [address] - [phone]  - [phone] [address] [place] [address]      Hours: 24-hours Phone: [phone]   hydrOXYzine pamoate 25 MG capsule

## 2023-12-19 NOTE — DISCHARGE INSTRUCTIONS
Take medication as prescribed. Keep a blood pressure diary. Follow-up with your Doctor this week for re-evaluation. Return if problems.

## 2024-01-02 ENCOUNTER — APPOINTMENT (OUTPATIENT)
Dept: GENERAL RADIOLOGY | Facility: HOSPITAL | Age: 44
End: 2024-01-02
Payer: MEDICAID

## 2024-01-02 ENCOUNTER — HOSPITAL ENCOUNTER (EMERGENCY)
Facility: HOSPITAL | Age: 44
Discharge: HOME | End: 2024-01-02
Attending: EMERGENCY MEDICINE | Admitting: EMERGENCY MEDICINE
Payer: MEDICAID

## 2024-01-02 VITALS
RESPIRATION RATE: 18 BRPM | OXYGEN SATURATION: 97 % | TEMPERATURE: 97.3 F | DIASTOLIC BLOOD PRESSURE: 93 MMHG | SYSTOLIC BLOOD PRESSURE: 133 MMHG | HEART RATE: 69 BPM | BODY MASS INDEX: 25.62 KG/M2 | HEIGHT: 69 IN | WEIGHT: 173 LBS

## 2024-01-02 DIAGNOSIS — R53.83 MALAISE AND FATIGUE: Primary | ICD-10-CM

## 2024-01-02 DIAGNOSIS — R53.81 MALAISE AND FATIGUE: Primary | ICD-10-CM

## 2024-01-02 LAB
BILIRUB UR QL STRIP: NEGATIVE
CLARITY UR: CLEAR
COLOR UR: YELLOW
GLUCOSE BLDC GLUCOMTR-MCNC: 137 MG/DL (ref 70–130)
GLUCOSE UR STRIP-MCNC: NEGATIVE MG/DL
HGB UR QL STRIP.AUTO: ABNORMAL
KETONES UR QL STRIP: NEGATIVE
LEUKOCYTE ESTERASE UR QL STRIP.AUTO: NEGATIVE
NITRITE UR QL STRIP: NEGATIVE
PH UR STRIP.AUTO: 6 [PH] (ref 5–8)
PROT UR QL STRIP: NEGATIVE
SP GR UR STRIP: >=1.03 (ref 1–1.03)
UROBILINOGEN UR QL STRIP: ABNORMAL

## 2024-01-02 PROCEDURE — 71045 X-RAY EXAM CHEST 1 VIEW: CPT

## 2024-01-02 PROCEDURE — 81003 URINALYSIS AUTO W/O SCOPE: CPT | Performed by: EMERGENCY MEDICINE

## 2024-01-02 PROCEDURE — 82948 REAGENT STRIP/BLOOD GLUCOSE: CPT

## 2024-01-02 PROCEDURE — 99283 EMERGENCY DEPT VISIT LOW MDM: CPT

## 2024-01-02 RX ORDER — HYDROXYZINE HYDROCHLORIDE 25 MG/1
25 TABLET, FILM COATED ORAL ONCE
Status: COMPLETED | OUTPATIENT
Start: 2024-01-02 | End: 2024-01-02

## 2024-01-02 RX ORDER — HYDROXYZINE HYDROCHLORIDE 25 MG/1
25 TABLET, FILM COATED ORAL EVERY 6 HOURS PRN
Qty: 22 TABLET | Refills: 0 | Status: SHIPPED | OUTPATIENT
Start: 2024-01-02 | End: 2024-01-17

## 2024-01-02 RX ADMIN — HYDROXYZINE HYDROCHLORIDE 25 MG: 25 TABLET, FILM COATED ORAL at 02:39

## 2024-01-02 NOTE — FSED PROVIDER NOTE
Subjective   History of Present Illness    43-year-old male presents emergency department with concerns about anxiety.  He recently had a death in the family his son was murdered.  He is also been dealing with anxiety he has at work.  Has been taking some BuSpar for anxiety he has got a lot of things on his mind.  He was worried because not been able to sleep and has been taking trazodone to sleep.  A lot of things been going on for him.  He has no difficulty breathing.  He had a full cardiac workup recently but things have been getting significantly worse for him now    Review of Systems    All systems negative except as otherwise mentioned in the HPI        Past Medical History:   Diagnosis Date    Back pain     Kidney stone     Neck pain        Allergies   Allergen Reactions    Penicillins Unknown (See Comments)     States he is unsure       Past Surgical History:   Procedure Laterality Date    SHOULDER SURGERY         Family History   Problem Relation Age of Onset    No Known Problems Mother     Heart attack Father        Social History     Socioeconomic History    Marital status:    Tobacco Use    Smoking status: Every Day     Packs/day: .5     Types: Cigarettes    Smokeless tobacco: Never   Vaping Use    Vaping Use: Never used   Substance and Sexual Activity    Alcohol use: Not Currently     Comment: occassionally    Drug use: Yes     Types: Marijuana     Comment: occassionally. Patient states he do not do any drugs    Sexual activity: Defer           Objective   Physical Exam    General: Alert and oriented, conversant  Eye: PERRL, EOMI, nomal conjunctiva  HENT: Normocephalic, normal hearing, moist oral mucosa    Lungs: Nonlabored respiration, no wheezing  Heart: Normal Rate, no mumurs gallops or rubs  Abdomen: Soft, Non tender, no peritoneal signs    Musculoskeletal: Normal range of motion and strength, no tenderness or swelling  Skin: Warm and dry, no alarming rashes  Neurologic: Awake, responsive,  moving all extremities, no focal deficits  Psychiatric:  Cooperative, appropriate mood and affect    Procedures           ED Course                                           Medical Decision Making  Problems Addressed:  Malaise and fatigue: complicated acute illness or injury    Amount and/or Complexity of Data Reviewed  Labs: ordered.  Radiology: ordered.    Risk  Prescription drug management.    43-year-old male presents to the emergency department with malaise and fatigue.  Has been dealing with a lot of things on his plate and stress from life.  He is taken some anxiety medicine.  He does have an appointment to see his doctor for 2 weeks but he was hoping to get some more answers here.  He was told he was told his kidneys had some issues.  He had a slight bump in his creatinine last time.  I reviewed it does not seem to be the cause of this.  There was no anemia or other abnormalities at that time.    He had a full cardiac workup prior.  His blood sugar today was 137 but has not fasted.  He is otherwise stable and should be okay for discharge home    Final diagnoses:   Malaise and fatigue       ED Disposition  ED Disposition       ED Disposition   Discharge    Condition   Stable    Comment   --               Jodi Qiu PA  [address]  [place] [address] [phone]    In 2 days  If symptoms worsen         Medication List        New Prescriptions      hydrOXYzine 25 MG tablet  Commonly known as: ATARAX  Take 1 tablet by mouth Every 6 (Six) Hours As Needed for Anxiety for up to 15 days.               Where to Get Your Medications        These medications were sent to [id number] - [place], [place] - [address] - [phone]  - [phone] FX  [address] [place] [address]      Phone: [phone]   hydrOXYzine 25 MG tablet

## 2024-01-12 ENCOUNTER — TRANSCRIBE ORDERS (OUTPATIENT)
Dept: PSYCHIATRY | Facility: CLINIC | Age: 44
End: 2024-01-12
Payer: MEDICAID

## 2024-01-12 DIAGNOSIS — F32.A DEPRESSION, UNSPECIFIED DEPRESSION TYPE: Primary | ICD-10-CM

## 2024-01-21 ENCOUNTER — HOSPITAL ENCOUNTER (EMERGENCY)
Facility: HOSPITAL | Age: 44
Discharge: HOME | End: 2024-01-21
Attending: EMERGENCY MEDICINE | Admitting: EMERGENCY MEDICINE
Payer: MEDICAID

## 2024-01-21 ENCOUNTER — APPOINTMENT (OUTPATIENT)
Dept: GENERAL RADIOLOGY | Facility: HOSPITAL | Age: 44
End: 2024-01-21
Payer: MEDICAID

## 2024-01-21 VITALS
HEART RATE: 78 BPM | DIASTOLIC BLOOD PRESSURE: 94 MMHG | OXYGEN SATURATION: 96 % | WEIGHT: 170 LBS | SYSTOLIC BLOOD PRESSURE: 151 MMHG | BODY MASS INDEX: 25.18 KG/M2 | HEIGHT: 69 IN | TEMPERATURE: 98.3 F | RESPIRATION RATE: 18 BRPM

## 2024-01-21 DIAGNOSIS — J40 BRONCHITIS: Primary | ICD-10-CM

## 2024-01-21 LAB
FLUAV SUBTYP SPEC NAA+PROBE: NOT DETECTED
FLUBV RNA ISLT QL NAA+PROBE: NOT DETECTED
SARS-COV-2 RNA RESP QL NAA+PROBE: NOT DETECTED
STREP A PCR: NOT DETECTED

## 2024-01-21 PROCEDURE — 25010000002 DEXAMETHASONE PER 1 MG: Performed by: EMERGENCY MEDICINE

## 2024-01-21 PROCEDURE — 71046 X-RAY EXAM CHEST 2 VIEWS: CPT

## 2024-01-21 PROCEDURE — 99283 EMERGENCY DEPT VISIT LOW MDM: CPT

## 2024-01-21 PROCEDURE — 87636 SARSCOV2 & INF A&B AMP PRB: CPT

## 2024-01-21 PROCEDURE — 87651 STREP A DNA AMP PROBE: CPT

## 2024-01-21 PROCEDURE — 99283 EMERGENCY DEPT VISIT LOW MDM: CPT | Performed by: EMERGENCY MEDICINE

## 2024-01-21 RX ORDER — GUAIFENESIN/DEXTROMETHORPHAN 100-10MG/5
10 SYRUP ORAL ONCE
Status: COMPLETED | OUTPATIENT
Start: 2024-01-21 | End: 2024-01-21

## 2024-01-21 RX ORDER — DOXYCYCLINE 100 MG/1
100 CAPSULE ORAL ONCE
Status: COMPLETED | OUTPATIENT
Start: 2024-01-21 | End: 2024-01-21

## 2024-01-21 RX ORDER — DOXYCYCLINE HYCLATE 100 MG/1
100 TABLET, DELAYED RELEASE ORAL 2 TIMES DAILY
Qty: 14 TABLET | Refills: 0 | Status: SHIPPED | OUTPATIENT
Start: 2024-01-21 | End: 2024-01-28

## 2024-01-21 RX ORDER — LORATADINE 10 MG/1
10 TABLET ORAL DAILY
Qty: 7 TABLET | Refills: 0 | Status: SHIPPED | OUTPATIENT
Start: 2024-01-21

## 2024-01-21 RX ORDER — NAPROXEN 500 MG/1
500 TABLET ORAL 2 TIMES DAILY PRN
Qty: 20 TABLET | Refills: 0 | Status: SHIPPED | OUTPATIENT
Start: 2024-01-21

## 2024-01-21 RX ADMIN — DEXAMETHASONE SODIUM PHOSPHATE 10 MG: 10 INJECTION INTRAMUSCULAR; INTRAVENOUS at 03:35

## 2024-01-21 RX ADMIN — GUAIFENESIN SYRUP AND DEXTROMETHORPHAN 10 ML: 100; 10 SYRUP ORAL at 03:35

## 2024-01-21 RX ADMIN — DOXYCYCLINE 100 MG: 100 CAPSULE ORAL at 03:35

## 2024-01-21 NOTE — Clinical Note
[place] [address] [place] [address] [place] [address]  Phone: [phone]    Sergey Jason was seen and treated in our emergency department on 1/21/2024.  He may return to work on 01/22/2024.         Thank you for choosing [facility].    Isidra Hayes RN

## 2024-01-21 NOTE — FSED PROVIDER NOTE
Subjective   History of Present Illness    43-year-old male presents with chest congestion.  Has had it for the past 4 days.  Said he cannot seem to shake it.  He thought he may need some steroids may be an antibiotic.  He has been coughing and having some subjective fevers and chills at home and achiness.  Also some nasal congestion.  He feels worse in the morning and then gets progressively miserable all day.  No neck stiffness.  No meningismus.  No vomiting chest pain or shortness of breath    Review of Systems    All systems negative except as otherwise mentioned in the HPI    Past Medical History:   Diagnosis Date    Back pain     Kidney stone     Neck pain        Allergies   Allergen Reactions    Penicillins Unknown (See Comments)     States he is unsure       Past Surgical History:   Procedure Laterality Date    SHOULDER SURGERY         Family History   Problem Relation Age of Onset    No Known Problems Mother     Heart attack Father        Social History     Socioeconomic History    Marital status:    Tobacco Use    Smoking status: Every Day     Packs/day: .5     Types: Cigarettes    Smokeless tobacco: Never   Vaping Use    Vaping Use: Every day    Substances: Nicotine   Substance and Sexual Activity    Alcohol use: Not Currently     Comment: 04/04/2021    Drug use: Not Currently    Sexual activity: Defer           Objective   Physical Exam    General: Alert and oriented, conversant  Eye: PERRL, EOMI, nomal conjunctiva  HENT: Normocephalic, normal hearing, moist oral mucosa    Lungs: Nonlabored respiration, no wheezing  Heart: Normal Rate, no mumurs gallops or rubs  Abdomen: Soft, Non tender, no peritoneal signs    Musculoskeletal: Normal range of motion and strength, no tenderness or swelling  Skin: Warm and dry, no alarming rashes  Neurologic: Awake, responsive, moving all extremities, no focal deficits  Psychiatric:  Cooperative, appropriate mood and affect    Procedures           ED Course                                            Medical Decision Making  Problems Addressed:  Bronchitis: complicated acute illness or injury    Amount and/or Complexity of Data Reviewed  Radiology: ordered.    Risk  OTC drugs.  Prescription drug management.    Patient presents with upper respiratory infection symptoms.  He has normal vital signs.  He will be given antibiotics and symptomatic medications for relief.  Chest x-ray interpreted as no acute process    Final diagnoses:   Bronchitis       ED Disposition  ED Disposition       ED Disposition   Discharge    Condition   Stable    Comment   --               Jodi Qiu PA  [address]  [place] [address] [phone]    In 2 days  If symptoms worsen         Medication List        New Prescriptions      doxycycline 100 MG enteric coated tablet  Commonly known as: DORYX  Take 1 tablet by mouth 2 (Two) Times a Day for 7 days.     loratadine 10 MG tablet  Commonly known as: CLARITIN  Take 1 tablet by mouth Daily.     naproxen 500 MG tablet  Commonly known as: NAPROSYN  Take 1 tablet by mouth 2 (Two) Times a Day As Needed for Moderate Pain.               Where to Get Your Medications        These medications were sent to [id number] - [place], [place] - [address] - [phone]  - [phone] FX  [address] [place] [address]      Phone: [phone]   doxycycline 100 MG enteric coated tablet  loratadine 10 MG tablet  naproxen 500 MG tablet

## 2024-02-26 ENCOUNTER — TELEPHONE (OUTPATIENT)
Dept: PAIN MEDICINE | Facility: CLINIC | Age: 44
End: 2024-02-26
Payer: MEDICAID

## 2024-02-26 DIAGNOSIS — M43.10 PARS DEFECT WITH SPONDYLOLISTHESIS: Primary | ICD-10-CM

## 2024-02-26 DIAGNOSIS — M53.3 SACROILIAC JOINT DYSFUNCTION: ICD-10-CM

## 2024-02-26 DIAGNOSIS — Z79.899 HIGH RISK MEDICATION USE: ICD-10-CM

## 2024-02-26 RX ORDER — TRAMADOL HYDROCHLORIDE 50 MG/1
50 TABLET ORAL EVERY 8 HOURS PRN
Qty: 42 TABLET | Refills: 0 | Status: SHIPPED | OUTPATIENT
Start: 2024-02-26 | End: 2024-02-26 | Stop reason: SDUPTHER

## 2024-02-26 RX ORDER — TRAMADOL HYDROCHLORIDE 50 MG/1
50 TABLET ORAL EVERY 6 HOURS PRN
COMMUNITY
End: 2024-02-26 | Stop reason: SDUPTHER

## 2024-02-26 RX ORDER — TRAMADOL HYDROCHLORIDE 50 MG/1
50 TABLET ORAL EVERY 8 HOURS PRN
Qty: 42 TABLET | Refills: 0 | Status: SHIPPED | OUTPATIENT
Start: 2024-02-26 | End: 2024-02-27 | Stop reason: SDUPTHER

## 2024-02-26 NOTE — TELEPHONE ENCOUNTER
Provider: DR MEJIA     Caller: FIORDALIZA SPANGLER    Relationship to Patient: PATIENT     Pharmacy: [id number] - [place], [place] - [address] - [phone]  - [phone] FX [address] [place] [address] Phone: [phone] Fax: [phone] Hours: Not open 24 hours     Phone Number: [phone]    Reason for Call: PATIENT SCHEDULED FOLLOW UP  APPOINTMENT 02/29/24 AND WOULD LIKE TO KNOW SINCE HE CANNOT COME IN UNTIL 02/29/24 - CAN DR MEJIA REFILL HIS  TRAMADOL AND TIZANIDINE ?  PATIENT REQUESTING CALL BACK TO CONFIRM IF RX CAN BE ORDERED PRIOR TO HIS APPOINTMENT   When was the patient last seen: 11/30/2024

## 2024-02-27 RX ORDER — TIZANIDINE 4 MG/1
4 TABLET ORAL 3 TIMES DAILY PRN
Qty: 90 TABLET | Refills: 0 | Status: SHIPPED | OUTPATIENT
Start: 2024-02-27

## 2024-02-27 RX ORDER — TRAMADOL HYDROCHLORIDE 50 MG/1
50 TABLET ORAL EVERY 8 HOURS PRN
Qty: 42 TABLET | Refills: 0 | Status: SHIPPED | OUTPATIENT
Start: 2024-02-27 | End: 2024-03-12

## 2024-02-27 RX ORDER — TIZANIDINE 4 MG/1
4 TABLET ORAL 3 TIMES DAILY PRN
Qty: 90 TABLET | Refills: 0 | Status: SHIPPED | OUTPATIENT
Start: 2024-02-27 | End: 2024-02-27 | Stop reason: SDUPTHER

## 2024-02-27 RX ORDER — TRAMADOL HYDROCHLORIDE 50 MG/1
50 TABLET ORAL EVERY 8 HOURS PRN
Qty: 42 TABLET | Refills: 0 | Status: SHIPPED | OUTPATIENT
Start: 2024-02-27 | End: 2024-02-27 | Stop reason: SDUPTHER

## 2024-02-29 ENCOUNTER — OFFICE VISIT (OUTPATIENT)
Dept: PAIN MEDICINE | Facility: CLINIC | Age: 44
End: 2024-02-29
Payer: MEDICAID

## 2024-02-29 VITALS
RESPIRATION RATE: 16 BRPM | HEART RATE: 82 BPM | BODY MASS INDEX: 26.88 KG/M2 | OXYGEN SATURATION: 97 % | SYSTOLIC BLOOD PRESSURE: 142 MMHG | DIASTOLIC BLOOD PRESSURE: 102 MMHG | WEIGHT: 182 LBS

## 2024-02-29 DIAGNOSIS — M43.10 PARS DEFECT WITH SPONDYLOLISTHESIS: ICD-10-CM

## 2024-02-29 DIAGNOSIS — M53.3 SACROILIAC JOINT DYSFUNCTION: ICD-10-CM

## 2024-02-29 DIAGNOSIS — M47.816 LUMBAR SPONDYLOSIS: Primary | ICD-10-CM

## 2024-02-29 DIAGNOSIS — M51.36 DDD (DEGENERATIVE DISC DISEASE), LUMBAR: ICD-10-CM

## 2024-02-29 RX ORDER — GABAPENTIN 300 MG/1
300 CAPSULE ORAL
Qty: 30 CAPSULE | Refills: 1 | Status: SHIPPED | OUTPATIENT
Start: 2024-02-29 | End: 2024-04-29

## 2024-02-29 NOTE — PROGRESS NOTES
Subjective   Sergey Jason is a 43 y.o. male is here for follow up for lower back pain and neck pain.  Last seen on 11/30/23. Some of his pain has improved but still continues to have lower back pain especially with starting gym.     On last visit:        Lower back pain is 5/10 on VAS, at maximum is 7/10. Pain is aching, throbbing, sharp, spasms in nature. Pain is referred to b/l buttocks. The pain is constant. The pain is improved by SI joint injections but not . The pain is worse with everything. Pain is effecting her abilities to daily activities, mood, sleep.      Neck pain is 5/10 on VAS, at maximum is 8/10. Pain is tightness, sharp in nature. Pain is referred b/l trapezius. The pain is constnat. The pain is improved by massages. The pain is worse with sitting for long time. +headache from left occipital region to top of his head.     Previous Injection:   11/16/2023-LESI L5-S1- mild relief  10/5/23 - b/l SI joint injection -no pain relief.  7/28/2023-bilateral SI joint injections - 95% pain relief with diagnostic block; 95% pain relief for 2 months. Able to play golf, work out, didn't need pain meds.     Hx: Referred by Dr. Tidwell, Angel Tony II  for lower back pain and neck pain. Pain started about 3 months ago without any inciting events. Has neck pain and lower back currently.   He has tried ibuprofen, baclofen, Medrol Dosepak, hydrocodone, tramadol without significant pain relief.  He was started on tizanidine and tramadol by Dr. Tidwell on 5/19/2023. Patient has been referred to neurology, but can't see till 8/23.       PHQ-9- 6                       SOAPP- 13 - struggled with EtoH in past   [place] back disability scale - 27     PMH:   Kidney stones, Left shoulder surgeryX2, smoker     Current Medications:   Tizanidine 4 mg   Tramadol 50 mg TID PRN  Trazodone        Past Medications:  Tramadol 50 mg - felt like taking ibuprofen  Tylenol   Ibuprofen  Medrol dose pack  Baclofen - didn't help       Past Modalities:  TENS:                                                                          no                                                  Physical Therapy Within The Last 6 Months              yes - [facility] since 6/13/23 -8 weeks-no significant improvement.  Pain worsened  Psychotherapy                                                            no  Massage Therapy                                                       no     Patient Complains Of:  Uro-Fecal Incontinence          no  Weight Gain/Loss                   no  Fever/Chills                             no  Weakness                               no        PEG Assessment   What number best describes your pain on average in the past week?8  What number best describes how, during the past week, pain has interfered with your enjoyment of life?8  What number best describes how, during the past week, pain has interfered with your general activity?  8             Current Outpatient Medications:     Multiple Vitamins-Minerals (MULTIVITAMIN WITH MINERALS) tablet tablet, Take 1 tablet by mouth Daily., Disp: , Rfl:     naproxen (NAPROSYN) 500 MG tablet, Take 1 tablet by mouth 2 (Two) Times a Day As Needed for Moderate Pain., Disp: 20 tablet, Rfl: 0    traMADol (ULTRAM) 50 MG tablet, Take 1 tablet by mouth Every 8 (Eight) Hours As Needed for Moderate Pain for up to 14 days., Disp: 42 tablet, Rfl: 0    traZODone (DESYREL) 300 MG tablet, Take 1 tablet by mouth Daily. with food, Disp: , Rfl:     busPIRone (BUSPAR) 15 MG tablet, Take 2 tablets by mouth 2 (Two) Times a Day. (Patient not taking: Reported on 2/29/2024), Disp: , Rfl:     gabapentin (NEURONTIN) 300 MG capsule, Take 1 capsule by mouth every night at bedtime for 60 days., Disp: 30 capsule, Rfl: 1    Ibuprofen 3 %, Gabapentin 10 %, Baclofen 2 %, lidocaine 4 %, Apply 1-2 g topically to the appropriate area as directed 3 (Three) to 4 (Four) times daily., Disp: 90 g, Rfl: 5    loratadine (CLARITIN) 10 MG  tablet, Take 1 tablet by mouth Daily. (Patient not taking: Reported on 2/29/2024), Disp: 7 tablet, Rfl: 0    tiZANidine (ZANAFLEX) 4 MG tablet, Take 1 tablet by mouth 3 (Three) Times a Day As Needed for Muscle Spasms. (Patient not taking: Reported on 2/29/2024), Disp: 90 tablet, Rfl: 0    The following portions of the patient's history were reviewed and updated as appropriate: allergies, current medications, past family history, past medical history, past social history, past surgical history, and problem list.      REVIEW OF PERTINENT MEDICAL DATA    Past Medical History:   Diagnosis Date    Back pain     Kidney stone     Neck pain      Past Surgical History:   Procedure Laterality Date    SHOULDER SURGERY       Family History   Problem Relation Age of Onset    No Known Problems Mother     Heart attack Father      Social History     Socioeconomic History    Marital status:    Tobacco Use    Smoking status: Former     Packs/day: .5     Types: Cigarettes     Quit date: 2/26/2024    Smokeless tobacco: Never   Vaping Use    Vaping Use: Every day    Substances: Nicotine   Substance and Sexual Activity    Alcohol use: Not Currently     Comment: 04/04/2021    Drug use: Not Currently    Sexual activity: Defer         Review of Systems   Musculoskeletal:  Positive for back pain.         Vitals:    02/29/24 1500   BP: (!) 142/102   Pulse: 82   Resp: 16   SpO2: 97%   Weight: 82.6 kg (182 lb)   PainSc:   5             Objective   Physical Exam  Neck:     Musculoskeletal:         General: Tenderness present.        Back:         Legs:    Neurological:      Deep Tendon Reflexes:      Reflex Scores:       Tricep reflexes are 2+ on the right side and 2+ on the left side.       Bicep reflexes are 2+ on the right side and 2+ on the left side.       Brachioradialis reflexes are 2+ on the right side and 2+ on the left side.       Patellar reflexes are 2+ on the right side and 2+ on the left side.       Achilles reflexes are 2+  on the right side and 2+ on the left side.     Comments: Motor strength 5/5 b/l LE, UE  Sensory intact b/l LE, UE             Imaging Reviewed:  MRI lumbar spine without contrast-10/27/2023  - Grade 1 chronic L5 spondylosis with grade 1 spondylolisthesis at L5-S1.  - Moderate right neuroforaminal stenosis at this level with potential contact of the exiting right L5 nerve root.  L4-5-mild disc bulge with osteophyte formation.    CT cervical spine without contrast-2/21/2023  - Multilevel spondylosis with areas of disc osteophyte complex formation  - Facet arthropathy at multiple levels most most notable at C6-7.  - Limited spinal canal and neural foraminal evaluation with likely mild to moderate stenosis present.     Lumbar x-ray-5/10/2023  - Disc space narrowing at L5-S1.  - Grade 1 anterolisthesis of L5 on S1 with bilateral pars intercularis defect     X-ray thoracic spine-1/1/2021  - No acute findings     X-ray cervical spine-1/1/2021  - Cervical spondylosis at C5-C6 and C6-7.     CT abdominal pelvis without IV contrast-10/5/2022  - Small nonobstructing colliculi in left kidney.  Largest is in the upper to mid left kidney and measures approximately 3 to 4 mm.       Assessment:    1. Lumbar spondylosis    2. Pars defect with spondylolisthesis    3. Sacroiliac joint dysfunction    4. DDD (degenerative disc disease), lumbar          Plan:   1. UDS on 6/1/23 consistent with patient interview and positive for tramadol.   Narcotic contract signed on 6/1/2023.  Point-of-care negative for any nonprescribed substances.  Patient had alcohol issues in the past.  Discussed with patient that he is at significantly higher risk of addiction with pain medications and I do not recommend pain medications for longer period of time.  2. We discussed trying a course of formal physical therapy.  Physical therapy can help strengthen and stretch the muscles around the joints. Continue to be as active as possible. Start physical therapy as  it will help generalized pain and follow up with HEP.  PT referral sent to [facility] - 6/1/23.  Assigned home exercises program to be started on 6/1/2023.  3.  Patient has failed Tylenol, ibuprofen, meloxicam.   4.  Continue Tizanidine 4 mg qhs PRN. Side effect profile discussed with the patient including but not limited to transient drowsiness, dizziness, weakness, fatigue, confusion, headache, insomnia, nausea, constipation and urinary frequency. Patient understands and agrees.  5.  He would like to go back to Tramadol from [place]. Restart Tramadol 50 mg TID PRN-2/29/2024. Side effects discussed including but not limited to nausea, vomiting, constipation, lightheadedness, dizziness, drowsiness, or headache. This medication may increase serotonin and rarely cause a very serious condition called serotonin syndrome/ toxicity.   6. Continue Gabapentin 300 mg qhs. Side effects discussed with the patient including but not limited to somnolence, dizziness, ataxia, headache, fatigue, blurred vision, cold or flu-like symptoms,delusions, hoarseness, lack or loss of strength, lower back or side pain, swelling of the hands, feet, or lower legs trembling or shaking. Patient understands and agrees with the plan.  6.  Discussed cervical spine x-ray and cervical CT scan.  CT of cervical spine shows facet arthritis most notably at C6-C7 level.  Patient has bilateral facet tenderness and facet loading positive bilateral C4-C7.  Discussed with patient that we may consider bilateral MBB C5-C7 in future if pain does not improve with conservative management.  Patient understands and agrees with the plan.  7.  Patient has mainly AXIAL lower back pain. Patient informed they would likely benefit from a medial branch block on bilateral from L5-S2.  MRI shows facet arthritis. Facet tenderness is positive from L4-S2.  Patient informed the procedure is both therapeutic and diagnostic in nature.  The procedure was described in detail and the  risks, benefits and alternatives were discussed with the patient (including but not limited to: bleeding, infection, nerve damage, worsening of pain, CSF leak, inability to perform injection, paralysis, seizures, and death) who agreed to proceed.  Discussed RFA at 70-80 degree for  sec if patient has good relief from 2 diagnostic MBB.    8.  Sent biomed cream with 5 refills.        RTC for procedure in 3 weeks follow-up.    Juan David Dior DO  Pain Management   [facility]        INSPECT REPORT    As part of the patient's treatment plan, I may be prescribing controlled substances. The patient has been made aware of appropriate use of such medications, including potential risk of somnolence, limited ability to drive and/or work safely, and the potential for dependence or overdose. It has also been made clear that these medications are for use by this patient only, without concomitant use of alcohol or other substances unless prescribed.     Patient has completed prescribing agreement detailing terms of continued prescribing of controlled substances, including monitoring INSPECT reports, urine drug screening, and pill counts if necessary. The patient is aware that inappropriate use will results in cessation of prescribing such medications.    INSPECT report has been reviewed and scanned into the patient's chart.

## 2024-03-14 ENCOUNTER — HOSPITAL ENCOUNTER (OUTPATIENT)
Dept: PAIN MEDICINE | Facility: HOSPITAL | Age: 44
Discharge: HOME | End: 2024-03-14
Payer: MEDICAID

## 2024-03-14 VITALS
RESPIRATION RATE: 12 BRPM | WEIGHT: 180 LBS | TEMPERATURE: 97.5 F | HEART RATE: 76 BPM | SYSTOLIC BLOOD PRESSURE: 122 MMHG | OXYGEN SATURATION: 98 % | BODY MASS INDEX: 26.66 KG/M2 | DIASTOLIC BLOOD PRESSURE: 82 MMHG | HEIGHT: 69 IN

## 2024-03-14 DIAGNOSIS — R52 PAIN: ICD-10-CM

## 2024-03-14 DIAGNOSIS — M43.10 PARS DEFECT WITH SPONDYLOLISTHESIS: ICD-10-CM

## 2024-03-14 DIAGNOSIS — M47.816 LUMBAR SPONDYLOSIS: Primary | ICD-10-CM

## 2024-03-14 PROCEDURE — 77003 FLUOROGUIDE FOR SPINE INJECT: CPT

## 2024-03-14 PROCEDURE — 25010000002 METHYLPREDNISOLONE PER 80 MG: Performed by: STUDENT IN AN ORGANIZED HEALTH CARE EDUCATION/TRAINING PROGRAM

## 2024-03-14 PROCEDURE — 25010000002 BUPIVACAINE (PF) 0.25 % SOLUTION: Performed by: STUDENT IN AN ORGANIZED HEALTH CARE EDUCATION/TRAINING PROGRAM

## 2024-03-14 RX ORDER — HYDROCODONE BITARTRATE AND ACETAMINOPHEN 5; 325 MG/1; MG/1
1 TABLET ORAL EVERY 12 HOURS PRN
COMMUNITY
Start: 2024-03-07

## 2024-03-14 RX ORDER — BUPIVACAINE HYDROCHLORIDE 2.5 MG/ML
10 INJECTION, SOLUTION EPIDURAL; INFILTRATION; INTRACAUDAL ONCE
Status: COMPLETED | OUTPATIENT
Start: 2024-03-14 | End: 2024-03-14

## 2024-03-14 RX ORDER — METHYLPREDNISOLONE ACETATE 80 MG/ML
80 INJECTION, SUSPENSION INTRA-ARTICULAR; INTRALESIONAL; INTRAMUSCULAR; SOFT TISSUE ONCE
Status: COMPLETED | OUTPATIENT
Start: 2024-03-14 | End: 2024-03-14

## 2024-03-14 RX ORDER — TRAMADOL HYDROCHLORIDE 50 MG/1
50 TABLET ORAL 3 TIMES DAILY PRN
Qty: 90 TABLET | Refills: 0 | Status: SHIPPED | OUTPATIENT
Start: 2024-03-14 | End: 2024-04-13

## 2024-03-14 RX ORDER — LIDOCAINE HYDROCHLORIDE 10 MG/ML
5 INJECTION, SOLUTION EPIDURAL; INFILTRATION; INTRACAUDAL; PERINEURAL ONCE
Status: COMPLETED | OUTPATIENT
Start: 2024-03-14 | End: 2024-03-14

## 2024-03-14 RX ADMIN — METHYLPREDNISOLONE ACETATE 80 MG: 80 INJECTION, SUSPENSION INTRA-ARTICULAR; INTRALESIONAL; INTRAMUSCULAR; SOFT TISSUE at 10:25

## 2024-03-14 RX ADMIN — LIDOCAINE HYDROCHLORIDE 5 ML: 10 INJECTION, SOLUTION EPIDURAL; INFILTRATION; INTRACAUDAL; PERINEURAL at 10:20

## 2024-03-14 RX ADMIN — BUPIVACAINE HYDROCHLORIDE 10 ML: 2.5 INJECTION, SOLUTION EPIDURAL; INFILTRATION; INTRACAUDAL; PERINEURAL at 10:24

## 2024-03-14 NOTE — H&P
H and P reviewed from previous visit and no changes to patient's clinical presentation. Will proceed with procedure as planned. Patient denies history of DM and being on blood thinners.    Juan David Dior DO  Pain Management   [facility]

## 2024-03-14 NOTE — PROCEDURES
PRE OPERATIVE DIAGNOSIS:    1. Lumbar Spondylosis  2. Low Back Pain    POST OPERATIVE DIAGNOSIS:  Same.    PROCEDURE:  Bilateral L 5, SA MBB and S 1, S 2 LBB    INDICATION: Patient complains of pain in the lower back, bilateral.  Pain increases on extension of the spine, facet tenderness present.       PROCEDURE DETAILS:   After obtaining written informed consent patient was taken to the procedure room. Pre-procedure blood pressure and pulse were stable and recorded in patients clinic chart. The patient was placed in a prone position and the lower back was prepped with chloraprep and draped in the usual sterile fashion.  The skin was infiltrated with 1% lidocaine at the junction of transverse process with the vertebral body at the RIGHT L 5 level. A 22-gauge, 3.5-inch needle was inserted into the lumbar medial branch under radiographic guidance. Both AP and lateral views were obtained.   Following placement of the needle and negative aspiration, 2.0 cc of bupivacaine 0.25% with kenalog was injected. The identical procedure was performed on RIGHT SA medial branch. A 22 g spinal needle was used to get the S 1 and S 2 lateral branch. During the procedure there was no evidence of CSF, paresthesias or heme.    Similar procedure was done on left side at L5, Sa, S1, S2.     A total of 8 cc of 0.25% bupivacaine with 80 mg of DepoMedrol was injected.    After the procedure the needles were removed. Skin was cleaned and a sterile dressing was applied.  Following the procedure the patient's vital signs were stable. The patient was discharged home in good condition after being given discharge instructions.    COMPLICATIONS None    Juan David Dior DO  Pain Management   [facility]

## 2024-03-15 ENCOUNTER — TELEPHONE (OUTPATIENT)
Dept: PAIN MEDICINE | Facility: HOSPITAL | Age: 44
End: 2024-03-15
Payer: MEDICAID

## 2024-03-15 NOTE — TELEPHONE ENCOUNTER
Post procedure phone call completed.  Pt states they are doing good and denies questions or concerns.  Patient reports pain level a #2 with 80% pain relief from injection.

## 2024-03-28 NOTE — PROGRESS NOTES
Subjective   Sergey Jason is a 43 y.o. male is here for follow up for lower back pain and neck pain.  Last seen for bilateral MBB L4-S2 with excellent relief. Lower back pain has significantly worsened since last 4 days.     On last visit:        Lower back pain is 8/10 on VAS, at maximum is 9/10. Pain is aching, throbbing, sharp, spasms in nature. Pain is referred to b/l buttocks. The pain is constant. The pain is improved by SI joint injections but not with second, MBB . The pain is worse with everything. Pain is effecting her abilities to daily activities, mood, sleep.      Neck pain is 5/10 on VAS, at maximum is 8/10. Pain is tightness, sharp in nature. Pain is referred b/l trapezius. The pain is constnat. The pain is improved by massages. The pain is worse with sitting for long time. +headache from left occipital region to top of his head.     Previous Injection:   3/14/2024-bilateral L5-S2 MBB-80% pain relief with diagnostic block for 3-4 days and pain slowly returned.  11/16/2023-LESI L5-S1- mild relief  10/5/23 - b/l SI joint injection -no pain relief.  7/28/2023-bilateral SI joint injections - 95% pain relief with diagnostic block; 95% pain relief for 2 months. Able to play golf, work out, didn't need pain meds.     Hx: Referred by Dr. Tidwell, Angel Jimenez II, DO for lower back pain and neck pain. Pain started about 3 months ago without any inciting events. Has neck pain and lower back currently.   He has tried ibuprofen, baclofen, Medrol Dosepak, hydrocodone, tramadol without significant pain relief.  He was started on tizanidine and tramadol by Dr. Tidwell on 5/19/2023. Patient has been referred to neurology, but can't see till 8/23.       PHQ-9- 6                       SOAPP- 13 - struggled with EtoH in past   [place] back disability scale - 27     PMH:   Kidney stones, Left shoulder surgeryX2, smoker     Current Medications:   Tizanidine 4 mg   Tramadol 50 mg TID PRN  Trazodone        Past  Medications:  Tramadol 50 mg - felt like taking ibuprofen  Tylenol   Ibuprofen  Medrol dose pack  Baclofen - didn't help      Past Modalities:  TENS:                                                                          no                                                  Physical Therapy Within The Last 6 Months              yes - [facility] since 6/13/23 -8 weeks-no significant improvement.  Pain worsened  Psychotherapy                                                            no  Massage Therapy                                                       no     Patient Complains Of:  Uro-Fecal Incontinence          no  Weight Gain/Loss                   no  Fever/Chills                             no  Weakness                               no        PEG Assessment   What number best describes your pain on average in the past week?8  What number best describes how, during the past week, pain has interfered with your enjoyment of life?8  What number best describes how, during the past week, pain has interfered with your general activity?  8             Current Outpatient Medications:     busPIRone (BUSPAR) 15 MG tablet, Take 2 tablets by mouth 2 (Two) Times a Day., Disp: , Rfl:     gabapentin (NEURONTIN) 300 MG capsule, Take 1 capsule by mouth every night at bedtime for 60 days., Disp: 30 capsule, Rfl: 1    HYDROcodone-acetaminophen (NORCO) 5-325 MG per tablet, Take 1 tablet by mouth 3 (Three) Times a Day As Needed for Moderate Pain for up to 7 days. for pain, Disp: 21 tablet, Rfl: 0    Ibuprofen 3 %, Gabapentin 10 %, Baclofen 2 %, lidocaine 4 %, Apply 1-2 g topically to the appropriate area as directed 3 (Three) to 4 (Four) times daily., Disp: 90 g, Rfl: 5    loratadine (CLARITIN) 10 MG tablet, Take 1 tablet by mouth Daily., Disp: 7 tablet, Rfl: 0    Multiple Vitamins-Minerals (MULTIVITAMIN WITH MINERALS) tablet tablet, Take 1 tablet by mouth Daily., Disp: , Rfl:     naproxen (NAPROSYN) 500 MG tablet, Take 1 tablet by  mouth 2 (Two) Times a Day As Needed for Moderate Pain., Disp: 20 tablet, Rfl: 0    tiZANidine (ZANAFLEX) 4 MG tablet, Take 1 tablet by mouth 3 (Three) Times a Day As Needed for Muscle Spasms., Disp: 90 tablet, Rfl: 0    traMADol (ULTRAM) 50 MG tablet, Take 1 tablet by mouth 3 (Three) Times a Day As Needed for Moderate Pain for up to 30 days., Disp: 90 tablet, Rfl: 0    traZODone (DESYREL) 300 MG tablet, Take 1 tablet by mouth Daily. with food, Disp: , Rfl:     The following portions of the patient's history were reviewed and updated as appropriate: allergies, current medications, past family history, past medical history, past social history, past surgical history, and problem list.      REVIEW OF PERTINENT MEDICAL DATA    Past Medical History:   Diagnosis Date    Back pain     Kidney stone     Neck pain      Past Surgical History:   Procedure Laterality Date    SHOULDER SURGERY       Family History   Problem Relation Age of Onset    No Known Problems Mother     Heart attack Father      Social History     Socioeconomic History    Marital status:    Tobacco Use    Smoking status: Former     Current packs/day: 0.00     Types: Cigarettes     Quit date: 2024     Years since quittin.1    Smokeless tobacco: Never   Vaping Use    Vaping status: Every Day    Substances: Nicotine   Substance and Sexual Activity    Alcohol use: Not Currently     Comment: 2021    Drug use: Not Currently    Sexual activity: Defer         Review of Systems   Musculoskeletal:  Positive for back pain.         Vitals:    24 0811   BP: 144/96   Pulse: 66   Resp: 16   SpO2: 95%   Weight: 87.1 kg (192 lb 1.6 oz)   PainSc:   8               Objective   Physical Exam  Neck:     Musculoskeletal:         General: Tenderness present.        Back:         Legs:    Neurological:      Deep Tendon Reflexes:      Reflex Scores:       Tricep reflexes are 2+ on the right side and 2+ on the left side.       Bicep reflexes are 2+ on the  right side and 2+ on the left side.       Brachioradialis reflexes are 2+ on the right side and 2+ on the left side.       Patellar reflexes are 2+ on the right side and 2+ on the left side.       Achilles reflexes are 2+ on the right side and 2+ on the left side.     Comments: Motor strength 5/5 b/l LE, UE  Sensory intact b/l LE, UE             Imaging Reviewed:  MRI lumbar spine without contrast-10/27/2023  - Grade 1 chronic L5 spondylosis with grade 1 spondylolisthesis at L5-S1.  - Moderate right neuroforaminal stenosis at this level with potential contact of the exiting right L5 nerve root.  L4-5-mild disc bulge with osteophyte formation.    CT cervical spine without contrast-2/21/2023  - Multilevel spondylosis with areas of disc osteophyte complex formation  - Facet arthropathy at multiple levels most most notable at C6-7.  - Limited spinal canal and neural foraminal evaluation with likely mild to moderate stenosis present.     Lumbar x-ray-5/10/2023  - Disc space narrowing at L5-S1.  - Grade 1 anterolisthesis of L5 on S1 with bilateral pars intercularis defect     X-ray thoracic spine-1/1/2021  - No acute findings     X-ray cervical spine-1/1/2021  - Cervical spondylosis at C5-C6 and C6-7.     CT abdominal pelvis without IV contrast-10/5/2022  - Small nonobstructing colliculi in left kidney.  Largest is in the upper to mid left kidney and measures approximately 3 to 4 mm.       Assessment:    1. Lumbar spondylosis    2. Pars defect with spondylolisthesis    3. Sacroiliac joint dysfunction    4. DDD (degenerative disc disease), lumbar    5. High risk medication use        Plan:   1. UDS on 6/1/23 consistent with patient interview and positive for tramadol.   Narcotic contract signed on 6/1/2023.  Point-of-care negative for any nonprescribed substances.  Patient had alcohol issues in the past.  Discussed with patient that he is at significantly higher risk of addiction with pain medications and I do not  recommend pain medications for longer period of time.  2. We discussed trying a course of formal physical therapy.  Physical therapy can help strengthen and stretch the muscles around the joints. Continue to be as active as possible. Start physical therapy as it will help generalized pain and follow up with HEP.  PT referral sent to [facility] - 6/1/23.  Assigned home exercises program to be started on 6/1/2023.  3.  Patient has failed Tylenol, ibuprofen, meloxicam.   4.  Continue Tizanidine 4 mg qhs PRN. Side effect profile discussed with the patient including but not limited to transient drowsiness, dizziness, weakness, fatigue, confusion, headache, insomnia, nausea, constipation and urinary frequency. Patient understands and agrees.  5.  Hold off on Tramadol. Given Norco 5-325 mg TID PRN for #21 tabs - 4/3/24 due to severe pain. Discussed with the patient regarding long-term side effects of opioids including but not limited to opioid induced hormonal suppression, hyperalgesia, fatigue, weight gain, possible opioid induced altered immune system, addiction, tolerance, dependence, risk of hearing loss and elevated risk of myocardial infarction. Proper use and potential life threatening side effects of over use discussed with patient. Patient states understanding of their use and risks.  Opioid Education for patient's receiving narcotics for pain: Patient was instructed regarding the risks, benefits, alternative forms of treatment, as well as potential development of tolerance and/or addiction. Patient was instructed to take the medication strictly as ordered, not to share the medication with others, not to drive while taking opioids, and to take no other sedatives/pain medications or alcohol while taking this prescription. The patient was instructed to wean off of the pain medication as healing occurs and pain resolves.   6. Continue Gabapentin 300 mg qhs. Side effects discussed with the patient including but not  limited to somnolence, dizziness, ataxia, headache, fatigue, blurred vision, cold or flu-like symptoms,delusions, hoarseness, lack or loss of strength, lower back or side pain, swelling of the hands, feet, or lower legs trembling or shaking. Patient understands and agrees with the plan.  6.  Discussed cervical spine x-ray and cervical CT scan.  CT of cervical spine shows facet arthritis most notably at C6-C7 level.  Patient has bilateral facet tenderness and facet loading positive bilateral C4-C7.  Discussed with patient that we may consider bilateral MBB C5-C7 in future if pain does not improve with conservative management.  Patient understands and agrees with the plan.  7.  Excellent relief with first diagnostic block. Patient has mainly AXIAL lower back pain. Patient informed they would likely benefit from a second diagnostic block medial branch block on bilateral from L5-S2.  MRI shows facet arthritis. Facet tenderness is positive from L4-S2.  Patient informed the procedure is both therapeutic and diagnostic in nature.  The procedure was described in detail and the risks, benefits and alternatives were discussed with the patient (including but not limited to: bleeding, infection, nerve damage, worsening of pain, CSF leak, inability to perform injection, paralysis, seizures, and death) who agreed to proceed.  Discussed RFA at 70-80 degree for  sec if patient has good relief from 2 diagnostic MBB.    8.  Sent biomed cream with 5 refills.        RTC for procedure and then RFA if successful.     Juan David Dior DO  Pain Management   [facility]        INSPECT REPORT    As part of the patient's treatment plan, I may be prescribing controlled substances. The patient has been made aware of appropriate use of such medications, including potential risk of somnolence, limited ability to drive and/or work safely, and the potential for dependence or overdose. It has also been made clear that these medications are for  use by this patient only, without concomitant use of alcohol or other substances unless prescribed.     Patient has completed prescribing agreement detailing terms of continued prescribing of controlled substances, including monitoring INSPECT reports, urine drug screening, and pill counts if necessary. The patient is aware that inappropriate use will results in cessation of prescribing such medications.    INSPECT report has been reviewed and scanned into the patient's chart.

## 2024-04-01 NOTE — TELEPHONE ENCOUNTER
Caller: Sergey Jason    Relationship to patient: Self    Best call back number: [phone]     Type of visit: FOLLOW UP / RIGHT ELBOW / NO NEW INJURY / PRIOR RIGHT ELBOW CORTISONE INJECTION 08-31-22 ([facility] PRP INJECTION)     Requested date: ANY DAY ANY TIME, 1ST AVAILABLE     Additional notes: PLEASE CALL / LEAVE VMAIL TO DISCUSS SCHEDULING     THANKS    Yes

## 2024-04-03 ENCOUNTER — OFFICE VISIT (OUTPATIENT)
Dept: PAIN MEDICINE | Facility: CLINIC | Age: 44
End: 2024-04-03
Payer: MEDICAID

## 2024-04-03 VITALS
WEIGHT: 192.1 LBS | OXYGEN SATURATION: 95 % | RESPIRATION RATE: 16 BRPM | DIASTOLIC BLOOD PRESSURE: 96 MMHG | HEART RATE: 66 BPM | SYSTOLIC BLOOD PRESSURE: 144 MMHG | BODY MASS INDEX: 28.37 KG/M2

## 2024-04-03 DIAGNOSIS — M51.36 DDD (DEGENERATIVE DISC DISEASE), LUMBAR: ICD-10-CM

## 2024-04-03 DIAGNOSIS — M53.3 SACROILIAC JOINT DYSFUNCTION: ICD-10-CM

## 2024-04-03 DIAGNOSIS — M47.816 LUMBAR SPONDYLOSIS: Primary | ICD-10-CM

## 2024-04-03 DIAGNOSIS — Z79.899 HIGH RISK MEDICATION USE: ICD-10-CM

## 2024-04-03 DIAGNOSIS — M43.10 PARS DEFECT WITH SPONDYLOLISTHESIS: ICD-10-CM

## 2024-04-03 PROCEDURE — 1160F RVW MEDS BY RX/DR IN RCRD: CPT | Performed by: STUDENT IN AN ORGANIZED HEALTH CARE EDUCATION/TRAINING PROGRAM

## 2024-04-03 PROCEDURE — 1159F MED LIST DOCD IN RCRD: CPT | Performed by: STUDENT IN AN ORGANIZED HEALTH CARE EDUCATION/TRAINING PROGRAM

## 2024-04-03 PROCEDURE — 99214 OFFICE O/P EST MOD 30 MIN: CPT | Performed by: STUDENT IN AN ORGANIZED HEALTH CARE EDUCATION/TRAINING PROGRAM

## 2024-04-03 PROCEDURE — 1125F AMNT PAIN NOTED PAIN PRSNT: CPT | Performed by: STUDENT IN AN ORGANIZED HEALTH CARE EDUCATION/TRAINING PROGRAM

## 2024-04-03 RX ORDER — HYDROCODONE BITARTRATE AND ACETAMINOPHEN 5; 325 MG/1; MG/1
1 TABLET ORAL 3 TIMES DAILY PRN
Qty: 21 TABLET | Refills: 0 | Status: SHIPPED | OUTPATIENT
Start: 2024-04-03 | End: 2024-04-10

## 2024-04-16 ENCOUNTER — HOSPITAL ENCOUNTER (OUTPATIENT)
Dept: PAIN MEDICINE | Facility: HOSPITAL | Age: 44
Discharge: HOME | End: 2024-04-16
Payer: MEDICAID

## 2024-04-16 VITALS
TEMPERATURE: 97.1 F | HEIGHT: 69 IN | SYSTOLIC BLOOD PRESSURE: 151 MMHG | HEART RATE: 110 BPM | RESPIRATION RATE: 16 BRPM | OXYGEN SATURATION: 97 % | WEIGHT: 192 LBS | BODY MASS INDEX: 28.44 KG/M2 | DIASTOLIC BLOOD PRESSURE: 86 MMHG

## 2024-04-16 DIAGNOSIS — M43.10 PARS DEFECT WITH SPONDYLOLISTHESIS: ICD-10-CM

## 2024-04-16 DIAGNOSIS — M47.816 LUMBAR SPONDYLOSIS: Primary | ICD-10-CM

## 2024-04-16 DIAGNOSIS — R52 PAIN: ICD-10-CM

## 2024-04-16 PROCEDURE — 77003 FLUOROGUIDE FOR SPINE INJECT: CPT

## 2024-04-16 PROCEDURE — 64493 INJ PARAVERT F JNT L/S 1 LEV: CPT | Performed by: STUDENT IN AN ORGANIZED HEALTH CARE EDUCATION/TRAINING PROGRAM

## 2024-04-16 PROCEDURE — 64494 INJ PARAVERT F JNT L/S 2 LEV: CPT | Performed by: STUDENT IN AN ORGANIZED HEALTH CARE EDUCATION/TRAINING PROGRAM

## 2024-04-16 PROCEDURE — 25010000002 BUPIVACAINE (PF) 0.25 % SOLUTION: Performed by: STUDENT IN AN ORGANIZED HEALTH CARE EDUCATION/TRAINING PROGRAM

## 2024-04-16 PROCEDURE — 25010000002 METHYLPREDNISOLONE PER 40 MG: Performed by: STUDENT IN AN ORGANIZED HEALTH CARE EDUCATION/TRAINING PROGRAM

## 2024-04-16 RX ORDER — OMEPRAZOLE 40 MG/1
40 CAPSULE, DELAYED RELEASE ORAL DAILY
COMMUNITY
Start: 2024-04-13

## 2024-04-16 RX ORDER — METHYLPREDNISOLONE ACETATE 40 MG/ML
40 INJECTION, SUSPENSION INTRA-ARTICULAR; INTRALESIONAL; INTRAMUSCULAR; SOFT TISSUE ONCE
Status: COMPLETED | OUTPATIENT
Start: 2024-04-16 | End: 2024-04-16

## 2024-04-16 RX ORDER — BUPIVACAINE HYDROCHLORIDE 2.5 MG/ML
10 INJECTION, SOLUTION EPIDURAL; INFILTRATION; INTRACAUDAL ONCE
Status: COMPLETED | OUTPATIENT
Start: 2024-04-16 | End: 2024-04-16

## 2024-04-16 RX ORDER — LIDOCAINE HYDROCHLORIDE 10 MG/ML
5 INJECTION, SOLUTION EPIDURAL; INFILTRATION; INTRACAUDAL; PERINEURAL ONCE
Status: COMPLETED | OUTPATIENT
Start: 2024-04-16 | End: 2024-04-16

## 2024-04-16 RX ORDER — HYDROCODONE BITARTRATE AND ACETAMINOPHEN 5; 325 MG/1; MG/1
1 TABLET ORAL EVERY 6 HOURS PRN
Qty: 28 TABLET | Refills: 0 | Status: SHIPPED | OUTPATIENT
Start: 2024-04-16 | End: 2024-04-23

## 2024-04-16 RX ADMIN — LIDOCAINE HYDROCHLORIDE 5 ML: 10 INJECTION, SOLUTION EPIDURAL; INFILTRATION; INTRACAUDAL; PERINEURAL at 15:22

## 2024-04-16 RX ADMIN — BUPIVACAINE HYDROCHLORIDE 10 ML: 2.5 INJECTION, SOLUTION EPIDURAL; INFILTRATION; INTRACAUDAL; PERINEURAL at 15:26

## 2024-04-16 RX ADMIN — METHYLPREDNISOLONE ACETATE 40 MG: 40 INJECTION, SUSPENSION INTRA-ARTICULAR; INTRALESIONAL; INTRAMUSCULAR; INTRASYNOVIAL; SOFT TISSUE at 15:26

## 2024-04-16 NOTE — ADDENDUM NOTE
Encounter addended by: Juan David Dior DO on: 4/16/2024 4:03 PM   Actions taken: Order list changed, Diagnosis association updated

## 2024-04-16 NOTE — H&P
H and P reviewed from previous visit and no changes to patient's clinical presentation. Will proceed with procedure as planned. Patient denies history of DM and being on blood thinners.    Juan David Dior DO  Pain Management   [facility]

## 2024-04-16 NOTE — PROCEDURES
PRE OPERATIVE DIAGNOSIS:    1. Lumbar Spondylosis  2. Low Back Pain    POST OPERATIVE DIAGNOSIS:  Same.    PROCEDURE:  Bilateral L 5, SA MBB and S 1, S 2 LBB    INDICATION: Patient complains of pain in the lower back, bilateral.  Pain increases on extension of the spine, facet tenderness present.       PROCEDURE DETAILS:   After obtaining written informed consent patient was taken to the procedure room. Pre-procedure blood pressure and pulse were stable and recorded in patients clinic chart. The patient was placed in a prone position and the lower back was prepped with chloraprep and draped in the usual sterile fashion.  The skin was infiltrated with 1% lidocaine at the junction of transverse process with the vertebral body at the RIGHT L 5 level. A 22-gauge, 3.5-inch needle was inserted into the lumbar medial branch under radiographic guidance. Both AP and lateral views were obtained.   Following placement of the needle and negative aspiration, 2.0 cc of bupivacaine 0.25% with kenalog was injected. The identical procedure was performed on RIGHT SA medial branch. A 22 g spinal needle was used to get the S 1 and S 2 lateral branch. During the procedure there was no evidence of CSF, paresthesias or heme.    Similar procedure was done on left side at L5, Sa, S1, S2.     A total of 8 cc of 0.25% bupivacaine with 40 mg of DepoMedrol was injected.    After the procedure the needles were removed. Skin was cleaned and a sterile dressing was applied.  Following the procedure the patient's vital signs were stable. The patient was discharged home in good condition after being given discharge instructions.    COMPLICATIONS None    Results:   Diagnostic #1: 3/14/2024-bilateral L5-S2 MBB-80% pain relief with diagnostic block for 3-4 days and pain slowly returned.     Diagnostic #2 block 4/16/24- 80% pain relief with VAS decreased from 10/10 to 2/10.     Will proceed with RFA with sedation.     Juan David Dior DO  Pain Management    [facility]

## 2024-04-17 ENCOUNTER — TELEPHONE (OUTPATIENT)
Dept: PAIN MEDICINE | Facility: HOSPITAL | Age: 44
End: 2024-04-17
Payer: MEDICAID

## 2024-04-17 NOTE — TELEPHONE ENCOUNTER
Post procedure phone call completed.  Pt states they are doing good and denies questions or concerns.  Patient reports 85% pain relief from procedure.

## 2024-05-02 ENCOUNTER — TELEPHONE (OUTPATIENT)
Dept: PAIN MEDICINE | Facility: CLINIC | Age: 44
End: 2024-05-02
Payer: MEDICAID

## 2024-05-02 DIAGNOSIS — Z79.899 HIGH RISK MEDICATION USE: ICD-10-CM

## 2024-05-02 DIAGNOSIS — M53.3 SACROILIAC JOINT DYSFUNCTION: ICD-10-CM

## 2024-05-02 DIAGNOSIS — M43.10 PARS DEFECT WITH SPONDYLOLISTHESIS: ICD-10-CM

## 2024-05-02 RX ORDER — TIZANIDINE 4 MG/1
4 TABLET ORAL 3 TIMES DAILY PRN
Qty: 90 TABLET | Refills: 0 | Status: SHIPPED | OUTPATIENT
Start: 2024-05-02

## 2024-05-02 NOTE — TELEPHONE ENCOUNTER
Caller: Sergey Jason    Relationship: Self    Best call back number:     Requested Prescriptions:   Requested Prescriptions     Pending Prescriptions Disp Refills    tiZANidine (ZANAFLEX) 4 MG tablet 90 tablet 0     Sig: Take 1 tablet by mouth 3 (Three) Times a Day As Needed for Muscle Spasms.        Pharmacy where request should be sent: [id number] [place] [address] [facility] [phone] [facility] [phone]      Last office visit with prescribing clinician: 4/3/2024   Last telemedicine visit with prescribing clinician: Visit date not found   Next office visit with prescribing clinician: Visit date not found     Additional details provided by patient: MR JASON WOULD LIKE ENOUGH MEDICATION TO GET HIM TO HIS PROCEDURE ON 5/9/24    MR JASON WOULD ALSO LIKE TO KNOW IF HE SHOULD OR SHOULD NOT TAKE MEDICATION THE MORNING OF HIS PROCEDURE. PLEASE CALL HIM BACK AND ADVISE.     Does the patient have less than a 3 day supply:  [x] Yes  [] No    Would you like a call back once the refill request has been completed: [x] Yes [] No    If the office needs to give you a call back, can they leave a voicemail: [x] Yes [] No    Jose Forde Rep   05/02/24 14:25 EDT

## 2024-05-07 ENCOUNTER — TELEPHONE (OUTPATIENT)
Dept: PAIN MEDICINE | Facility: CLINIC | Age: 44
End: 2024-05-07
Payer: MEDICAID

## 2024-05-07 DIAGNOSIS — M43.10 PARS DEFECT WITH SPONDYLOLISTHESIS: Primary | ICD-10-CM

## 2024-05-07 DIAGNOSIS — M47.816 LUMBAR SPONDYLOSIS: ICD-10-CM

## 2024-05-07 RX ORDER — HYDROCODONE BITARTRATE AND ACETAMINOPHEN 5; 325 MG/1; MG/1
1 TABLET ORAL EVERY 6 HOURS PRN
Qty: 28 TABLET | Refills: 0 | Status: SHIPPED | OUTPATIENT
Start: 2024-05-07 | End: 2024-05-14

## 2024-05-08 ENCOUNTER — TELEPHONE (OUTPATIENT)
Dept: PAIN MEDICINE | Facility: HOSPITAL | Age: 44
End: 2024-05-08
Payer: MEDICAID

## 2024-05-09 ENCOUNTER — HOSPITAL ENCOUNTER (OUTPATIENT)
Dept: PAIN MEDICINE | Facility: HOSPITAL | Age: 44
Discharge: HOME | End: 2024-05-09
Payer: MEDICAID

## 2024-05-09 VITALS
TEMPERATURE: 97.1 F | DIASTOLIC BLOOD PRESSURE: 92 MMHG | WEIGHT: 185 LBS | RESPIRATION RATE: 12 BRPM | OXYGEN SATURATION: 97 % | HEART RATE: 72 BPM | HEIGHT: 69 IN | SYSTOLIC BLOOD PRESSURE: 137 MMHG | BODY MASS INDEX: 27.4 KG/M2

## 2024-05-09 DIAGNOSIS — R52 PAIN: ICD-10-CM

## 2024-05-09 DIAGNOSIS — M47.817 LUMBOSACRAL SPONDYLOSIS WITHOUT MYELOPATHY: Primary | ICD-10-CM

## 2024-05-09 PROCEDURE — 25010000002 FENTANYL CITRATE (PF) 50 MCG/ML SOLUTION: Performed by: STUDENT IN AN ORGANIZED HEALTH CARE EDUCATION/TRAINING PROGRAM

## 2024-05-09 PROCEDURE — 25010000002 MIDAZOLAM PER 1 MG: Performed by: STUDENT IN AN ORGANIZED HEALTH CARE EDUCATION/TRAINING PROGRAM

## 2024-05-09 PROCEDURE — 77003 FLUOROGUIDE FOR SPINE INJECT: CPT

## 2024-05-09 RX ORDER — FENTANYL CITRATE 50 UG/ML
100 INJECTION, SOLUTION INTRAMUSCULAR; INTRAVENOUS ONCE
Status: COMPLETED | OUTPATIENT
Start: 2024-05-09 | End: 2024-05-09

## 2024-05-09 RX ORDER — LIDOCAINE HYDROCHLORIDE 20 MG/ML
10 INJECTION, SOLUTION INFILTRATION; PERINEURAL ONCE
Status: COMPLETED | OUTPATIENT
Start: 2024-05-09 | End: 2024-05-09

## 2024-05-09 RX ORDER — LIDOCAINE HYDROCHLORIDE 10 MG/ML
5 INJECTION, SOLUTION EPIDURAL; INFILTRATION; INTRACAUDAL; PERINEURAL ONCE
Status: COMPLETED | OUTPATIENT
Start: 2024-05-09 | End: 2024-05-09

## 2024-05-09 RX ORDER — MIDAZOLAM HYDROCHLORIDE 1 MG/ML
2 INJECTION INTRAMUSCULAR; INTRAVENOUS ONCE
Status: COMPLETED | OUTPATIENT
Start: 2024-05-09 | End: 2024-05-09

## 2024-05-09 RX ADMIN — LIDOCAINE HYDROCHLORIDE 10 ML: 20 INJECTION, SOLUTION INFILTRATION; PERINEURAL at 11:01

## 2024-05-09 RX ADMIN — MIDAZOLAM 2 MG: 1 INJECTION INTRAMUSCULAR; INTRAVENOUS at 10:46

## 2024-05-09 RX ADMIN — FENTANYL CITRATE 100 MCG: 50 INJECTION, SOLUTION INTRAMUSCULAR; INTRAVENOUS at 10:47

## 2024-05-09 RX ADMIN — LIDOCAINE HYDROCHLORIDE 5 ML: 10 INJECTION, SOLUTION EPIDURAL; INFILTRATION; INTRACAUDAL; PERINEURAL at 10:48

## 2024-05-10 ENCOUNTER — TELEPHONE (OUTPATIENT)
Dept: PAIN MEDICINE | Facility: HOSPITAL | Age: 44
End: 2024-05-10
Payer: MEDICAID

## 2024-05-16 ENCOUNTER — TELEPHONE (OUTPATIENT)
Dept: PAIN MEDICINE | Facility: CLINIC | Age: 44
End: 2024-05-16
Payer: MEDICAID

## 2024-05-16 NOTE — TELEPHONE ENCOUNTER
He will continue to monitor and let Dr. Dior know if pain does not improve. Patient states lower back is tender and it started today. Patient states he feels well over all. No other complaints such as redness, swelling, drainage or temp.

## 2024-05-16 NOTE — TELEPHONE ENCOUNTER
Provider: WILBUR MEJIA     Caller: FIORDALIZA SPANGLER     Relationship to Patient: SELF     Phone Number: [phone]    Reason for Call:   PATIENT HAD A LUMBAR SPONDYLOSIS DONE ON 05/09/15. HE SAYS WHEN HE TOUCHES THE SPOT WHERE IT WAS DONE HE SAYS IT REALLY BURNS AND WANTS TO KNOW IF THIS IS NORMAL. PLEASE ADVISE

## 2024-05-30 NOTE — PROGRESS NOTES
Subjective   Sergey Jason is a 43 y.o. male is here for follow up for lower back pain and neck pain.  Patient was last seen for bilateral RFA L4-S2 with excellent improvement in pain where he is able to decrease his pain meds. Takes Tramadol PRN when he is cutting grass for long time or playing multiple rounds of golf. Overall pain is very managable.      On last visit:        Lower back pain is 2/10 on VAS, at maximum is 3/10. Pain is aching, throbbing, sharp, spasms in nature. Pain is referred to b/l buttocks. The pain is constant. The pain is improved by SI joint RFA. The pain is worse with cutting grass, playing golf.      Neck pain is 5/10 on VAS, at maximum is 8/10. Pain is tightness, sharp in nature. Pain is referred b/l trapezius. The pain is constnat. The pain is improved by massages. The pain is worse with sitting for long time. +headache from left occipital region to top of his head.     Previous Injection:   5/9/2024- B/L L5-S2 RFA MB with sedation- 80% improvement in pain; only had to take 3 Norco since injection. Sleeping better; back to golfing. VAS down from 8/10 to 2/10.  3/14/2024-bilateral L5-S2 MBB-80% pain relief with diagnostic block for 3-4 days and pain slowly returned.  11/16/2023-LESI L5-S1- mild relief  10/5/23 - b/l SI joint injection -no pain relief.  7/28/2023-bilateral SI joint injections - 95% pain relief with diagnostic block; 95% pain relief for 2 months. Able to play golf, work out, didn't need pain meds.     Hx: Referred by Dr. Tidwell, Angel Jimenez II, DO for lower back pain and neck pain. Pain started about 3 months ago without any inciting events. Has neck pain and lower back currently.   He has tried ibuprofen, baclofen, Medrol Dosepak, hydrocodone, tramadol without significant pain relief.  He was started on tizanidine and tramadol by Dr. Tidwell on 5/19/2023. Patient has been referred to neurology, but can't see till 8/23.       PHQ-9- 6                       SOAPP- 13 -  struggled with EtoH in past   [place] back disability scale - 27     PMH:   Kidney stones, Left shoulder surgeryX2, smoker     Current Medications:   Tizanidine 4 mg   Tramadol 50 mg TID PRN  Trazodone        Past Medications:  Tramadol 50 mg - felt like taking ibuprofen  Tylenol   Ibuprofen  Medrol dose pack  Baclofen - didn't help      Past Modalities:  TENS:                                                                          no                                                  Physical Therapy Within The Last 6 Months              yes - [facility] since 6/13/23 -8 weeks-no significant improvement.  Pain worsened  Psychotherapy                                                            no  Massage Therapy                                                       no     Patient Complains Of:  Uro-Fecal Incontinence          no  Weight Gain/Loss                   no  Fever/Chills                             no  Weakness                               no        PEG Assessment   What number best describes your pain on average in the past week?8  What number best describes how, during the past week, pain has interfered with your enjoyment of life?8  What number best describes how, during the past week, pain has interfered with your general activity?  8             Current Outpatient Medications:     busPIRone (BUSPAR) 15 MG tablet, Take 2 tablets by mouth 2 (Two) Times a Day., Disp: , Rfl:     Multiple Vitamins-Minerals (MULTIVITAMIN WITH MINERALS) tablet tablet, Take 1 tablet by mouth Daily., Disp: , Rfl:     omeprazole (priLOSEC) 40 MG capsule, Take 1 capsule by mouth Daily. before a meal, Disp: , Rfl:     tiZANidine (ZANAFLEX) 4 MG tablet, Take 1 tablet by mouth 3 (Three) Times a Day As Needed for Muscle Spasms., Disp: 90 tablet, Rfl: 0    traZODone (DESYREL) 300 MG tablet, Take 1 tablet by mouth Daily. with food, Disp: , Rfl:     gabapentin (NEURONTIN) 300 MG capsule, Take 1 capsule by mouth every night at bedtime for  60 days., Disp: 30 capsule, Rfl: 1    traMADol (ULTRAM) 50 MG tablet, Take 1 tablet by mouth 3 (Three) Times a Day As Needed for Moderate Pain., Disp: 90 tablet, Rfl: 0    The following portions of the patient's history were reviewed and updated as appropriate: allergies, current medications, past family history, past medical history, past social history, past surgical history, and problem list.      REVIEW OF PERTINENT MEDICAL DATA    Past Medical History:   Diagnosis Date    Back pain     Kidney stone     Neck pain      Past Surgical History:   Procedure Laterality Date    SHOULDER SURGERY       Family History   Problem Relation Age of Onset    No Known Problems Mother     Heart attack Father      Social History     Socioeconomic History    Marital status:    Tobacco Use    Smoking status: Former     Current packs/day: 0.00     Types: Cigarettes     Quit date: 2024     Years since quittin.2    Smokeless tobacco: Never   Vaping Use    Vaping status: Every Day    Substances: Nicotine   Substance and Sexual Activity    Alcohol use: Not Currently     Comment: 2021    Drug use: Not Currently    Sexual activity: Defer         Review of Systems   Musculoskeletal:  Positive for back pain.         Vitals:    24 1458   BP: 127/89   BP Location: Left arm   Patient Position: Sitting   Cuff Size: Large Adult   Pulse: 89   Resp: 16   SpO2: 96%   Weight: 86.2 kg (190 lb)   PainSc:   2                 Objective   Physical Exam  Neck:     Musculoskeletal:         General: Tenderness present.        Back:         Legs:    Neurological:      Deep Tendon Reflexes:      Reflex Scores:       Tricep reflexes are 2+ on the right side and 2+ on the left side.       Bicep reflexes are 2+ on the right side and 2+ on the left side.       Brachioradialis reflexes are 2+ on the right side and 2+ on the left side.       Patellar reflexes are 2+ on the right side and 2+ on the left side.       Achilles reflexes are  2+ on the right side and 2+ on the left side.     Comments: Motor strength 5/5 b/l LE, UE  Sensory intact b/l LE, UE             Imaging Reviewed:  MRI lumbar spine without contrast-10/27/2023  - Grade 1 chronic L5 spondylosis with grade 1 spondylolisthesis at L5-S1.  - Moderate right neuroforaminal stenosis at this level with potential contact of the exiting right L5 nerve root.  L4-5-mild disc bulge with osteophyte formation.    CT cervical spine without contrast-2/21/2023  - Multilevel spondylosis with areas of disc osteophyte complex formation  - Facet arthropathy at multiple levels most most notable at C6-7.  - Limited spinal canal and neural foraminal evaluation with likely mild to moderate stenosis present.     Lumbar x-ray-5/10/2023  - Disc space narrowing at L5-S1.  - Grade 1 anterolisthesis of L5 on S1 with bilateral pars intercularis defect     X-ray thoracic spine-1/1/2021  - No acute findings     X-ray cervical spine-1/1/2021  - Cervical spondylosis at C5-C6 and C6-7.     CT abdominal pelvis without IV contrast-10/5/2022  - Small nonobstructing colliculi in left kidney.  Largest is in the upper to mid left kidney and measures approximately 3 to 4 mm.       Assessment:    1. Lumbosacral spondylosis without myelopathy    2. Pars defect with spondylolisthesis    3. Lumbar spondylosis    4. Sacroiliac joint dysfunction    5. High risk medication use          Plan:   1. UDS on 6/1/23 consistent with patient interview and positive for tramadol.   Narcotic contract signed on 6/1/2023.  Point-of-care negative for any nonprescribed substances.  Patient had alcohol issues in the past.  Discussed with patient that he is at significantly higher risk of addiction with pain medications and I do not recommend pain medications for longer period of time.  2. We discussed trying a course of formal physical therapy.  Physical therapy can help strengthen and stretch the muscles around the joints. Continue to be as active  as possible. Start physical therapy as it will help generalized pain and follow up with HEP.  PT referral sent to [facility] - 6/1/23.  Assigned home exercises program to be started on 6/1/2023.  3.  Patient has failed Tylenol, ibuprofen, meloxicam.   4.  Continue Tizanidine 4 mg qhs PRN. Side effect profile discussed with the patient including but not limited to transient drowsiness, dizziness, weakness, fatigue, confusion, headache, insomnia, nausea, constipation and urinary frequency. Patient understands and agrees.  5.  STOP Norco. Tramadol 50 mg PRN (#90 tabs sent on 6/3/24).  6. Continue Gabapentin 300 mg qhs. Side effects discussed with the patient including but not limited to somnolence, dizziness, ataxia, headache, fatigue, blurred vision, cold or flu-like symptoms,delusions, hoarseness, lack or loss of strength, lower back or side pain, swelling of the hands, feet, or lower legs trembling or shaking. Patient understands and agrees with the plan.  6.  Discussed cervical spine x-ray and cervical CT scan.  CT of cervical spine shows facet arthritis most notably at C6-C7 level.  Patient has bilateral facet tenderness and facet loading positive bilateral C4-C7.  Discussed with patient that we may consider bilateral MBB C5-C7 in future if pain does not improve with conservative management.  Patient understands and agrees with the plan.  7.  Sent biomed cream with 5 refills.        RTC in 3 months.     Juan David Dior DO  Pain Management   [facility]        INSPECT REPORT    As part of the patient's treatment plan, I may be prescribing controlled substances. The patient has been made aware of appropriate use of such medications, including potential risk of somnolence, limited ability to drive and/or work safely, and the potential for dependence or overdose. It has also been made clear that these medications are for use by this patient only, without concomitant use of alcohol or other substances unless prescribed.      Patient has completed prescribing agreement detailing terms of continued prescribing of controlled substances, including monitoring INSPECT reports, urine drug screening, and pill counts if necessary. The patient is aware that inappropriate use will results in cessation of prescribing such medications.    INSPECT report has been reviewed and scanned into the patient's chart.

## 2024-06-03 ENCOUNTER — OFFICE VISIT (OUTPATIENT)
Dept: PAIN MEDICINE | Facility: CLINIC | Age: 44
End: 2024-06-03
Payer: MEDICAID

## 2024-06-03 VITALS
OXYGEN SATURATION: 96 % | RESPIRATION RATE: 16 BRPM | DIASTOLIC BLOOD PRESSURE: 89 MMHG | SYSTOLIC BLOOD PRESSURE: 127 MMHG | WEIGHT: 190 LBS | HEART RATE: 89 BPM | BODY MASS INDEX: 28.06 KG/M2

## 2024-06-03 DIAGNOSIS — Z79.899 HIGH RISK MEDICATION USE: ICD-10-CM

## 2024-06-03 DIAGNOSIS — M43.10 PARS DEFECT WITH SPONDYLOLISTHESIS: ICD-10-CM

## 2024-06-03 DIAGNOSIS — M47.816 LUMBAR SPONDYLOSIS: ICD-10-CM

## 2024-06-03 DIAGNOSIS — M47.817 LUMBOSACRAL SPONDYLOSIS WITHOUT MYELOPATHY: Primary | ICD-10-CM

## 2024-06-03 DIAGNOSIS — M53.3 SACROILIAC JOINT DYSFUNCTION: ICD-10-CM

## 2024-06-03 PROCEDURE — 1125F AMNT PAIN NOTED PAIN PRSNT: CPT | Performed by: STUDENT IN AN ORGANIZED HEALTH CARE EDUCATION/TRAINING PROGRAM

## 2024-06-03 PROCEDURE — 1159F MED LIST DOCD IN RCRD: CPT | Performed by: STUDENT IN AN ORGANIZED HEALTH CARE EDUCATION/TRAINING PROGRAM

## 2024-06-03 PROCEDURE — 1160F RVW MEDS BY RX/DR IN RCRD: CPT | Performed by: STUDENT IN AN ORGANIZED HEALTH CARE EDUCATION/TRAINING PROGRAM

## 2024-06-03 PROCEDURE — 99214 OFFICE O/P EST MOD 30 MIN: CPT | Performed by: STUDENT IN AN ORGANIZED HEALTH CARE EDUCATION/TRAINING PROGRAM

## 2024-06-03 RX ORDER — TRAMADOL HYDROCHLORIDE 50 MG/1
50 TABLET ORAL 3 TIMES DAILY PRN
Qty: 90 TABLET | Refills: 0 | Status: SHIPPED | OUTPATIENT
Start: 2024-06-03

## 2024-10-10 NOTE — PROGRESS NOTES
Subjective   Sergey Jason is a 44 y.o. male is here for follow up for lower back pain and neck pain.  Patient was last seen on 6/3/2024. Pain has significantly worsened for last 2 weeks. No new bowel, bladder incontinence. Denies any injuries.     On last visit:        Lower back pain is 7/10 on VAS, at maximum is 8/10. Pain is aching, throbbing, sharp, spasms in nature. Pain is referred to b/l buttocks and intermittently to b/l legs. The pain is constant. The pain is improved by SI joint RFA. The pain is worse with cutting grass, playing golf.      Neck pain is 5/10 on VAS, at maximum is 8/10. Pain is tightness, sharp in nature. Pain is referred b/l trapezius. The pain is constnat. The pain is improved by massages. The pain is worse with sitting for long time. +headache from left occipital region to top of his head.     Previous Injection:   5/9/2024- B/L L5-S2 RFA MB with sedation- 80% improvement in pain; only had to take 3 Norco since injection. Sleeping better; back to golfing. VAS down from 8/10 to 2/10.  3/14/2024-bilateral L5-S2 MBB-80% pain relief with diagnostic block for 3-4 days and pain slowly returned.  11/16/2023-LESI L5-S1- mild relief  10/5/23 - b/l SI joint injection -no pain relief.  7/28/2023-bilateral SI joint injections - 95% pain relief with diagnostic block; 95% pain relief for 2 months. Able to play golf, work out, didn't need pain meds.     Hx: Referred by Dr. Tidwell, Angel Jimenez II, DO for lower back pain and neck pain. Pain started about 3 months ago without any inciting events. Has neck pain and lower back currently.   He has tried ibuprofen, baclofen, Medrol Dosepak, hydrocodone, tramadol without significant pain relief.  He was started on tizanidine and tramadol by Dr. Tidwell on 5/19/2023. Patient has been referred to neurology, but can't see till 8/23.       PHQ-9- 6                       SOAPP- 13 - struggled with EtoH in past   [place] back disability scale - 27     PMH:    Kidney stones, Left shoulder surgeryX2, smoker     Current Medications:   Tizanidine 4 mg   Tramadol 50 mg TID PRN  Trazodone        Past Medications:  Tramadol 50 mg - felt like taking ibuprofen  Tylenol   Ibuprofen  Medrol dose pack  Baclofen - didn't help      Past Modalities:  TENS:                                                                          no                                                  Physical Therapy Within The Last 6 Months              yes - [facility] since 6/13/23 -8 weeks-no significant improvement.  Pain worsened  Psychotherapy                                                            no  Massage Therapy                                                       no     Patient Complains Of:  Uro-Fecal Incontinence          no  Weight Gain/Loss                   no  Fever/Chills                             no  Weakness                               no        PEG Assessment   What number best describes your pain on average in the past week?8  What number best describes how, during the past week, pain has interfered with your enjoyment of life?8  What number best describes how, during the past week, pain has interfered with your general activity?  8             Current Outpatient Medications:     busPIRone (BUSPAR) 15 MG tablet, Take 2 tablets by mouth 2 (Two) Times a Day., Disp: , Rfl:     Multiple Vitamins-Minerals (MULTIVITAMIN WITH MINERALS) tablet tablet, Take 1 tablet by mouth Daily., Disp: , Rfl:     omeprazole (priLOSEC) 40 MG capsule, Take 1 capsule by mouth Daily. before a meal, Disp: , Rfl:     tiZANidine (ZANAFLEX) 4 MG tablet, Take 1 tablet by mouth 3 (Three) Times a Day As Needed for Muscle Spasms., Disp: 90 tablet, Rfl: 0    traMADol (ULTRAM) 50 MG tablet, Take 1 tablet by mouth 3 (Three) Times a Day As Needed for Moderate Pain., Disp: 90 tablet, Rfl: 0    traZODone (DESYREL) 300 MG tablet, Take 1 tablet by mouth Daily. with food, Disp: , Rfl:     gabapentin (NEURONTIN) 300 MG  capsule, Take 1 capsule by mouth every night at bedtime for 60 days., Disp: 30 capsule, Rfl: 1    The following portions of the patient's history were reviewed and updated as appropriate: allergies, current medications, past family history, past medical history, past social history, past surgical history, and problem list.      REVIEW OF PERTINENT MEDICAL DATA    Past Medical History:   Diagnosis Date    Back pain     Kidney stone     Neck pain      Past Surgical History:   Procedure Laterality Date    SHOULDER SURGERY       Family History   Problem Relation Age of Onset    No Known Problems Mother     Heart attack Father      Social History     Socioeconomic History    Marital status:    Tobacco Use    Smoking status: Former     Current packs/day: 0.00     Types: Cigarettes     Quit date: 2024     Years since quittin.6    Smokeless tobacco: Never   Vaping Use    Vaping status: Every Day    Substances: Nicotine   Substance and Sexual Activity    Alcohol use: Not Currently     Comment: 2021    Drug use: Not Currently    Sexual activity: Defer         Review of Systems   Musculoskeletal:  Positive for back pain.         Vitals:    10/14/24 0854   BP: 113/72   Pulse: 79   Resp: 16   SpO2: 96%   Weight: 89.1 kg (196 lb 8 oz)   PainSc:   7                   Objective   Physical Exam  Neck:     Musculoskeletal:         General: Tenderness present.        Back:         Legs:    Neurological:      Deep Tendon Reflexes:      Reflex Scores:       Tricep reflexes are 2+ on the right side and 2+ on the left side.       Bicep reflexes are 2+ on the right side and 2+ on the left side.       Brachioradialis reflexes are 2+ on the right side and 2+ on the left side.       Patellar reflexes are 2+ on the right side and 2+ on the left side.       Achilles reflexes are 2+ on the right side and 2+ on the left side.     Comments: Motor strength 5/5 b/l LE, UE  Sensory intact b/l LE, UE             Imaging  Reviewed:  MRI lumbar spine without contrast-10/27/2023  - Grade 1 chronic L5 spondylosis with grade 1 spondylolisthesis at L5-S1.  - Moderate right neuroforaminal stenosis at this level with potential contact of the exiting right L5 nerve root.  L4-5-mild disc bulge with osteophyte formation.    CT cervical spine without contrast-2/21/2023  - Multilevel spondylosis with areas of disc osteophyte complex formation  - Facet arthropathy at multiple levels most most notable at C6-7.  - Limited spinal canal and neural foraminal evaluation with likely mild to moderate stenosis present.     Lumbar x-ray-5/10/2023  - Disc space narrowing at L5-S1.  - Grade 1 anterolisthesis of L5 on S1 with bilateral pars intercularis defect     X-ray thoracic spine-1/1/2021  - No acute findings     X-ray cervical spine-1/1/2021  - Cervical spondylosis at C5-C6 and C6-7.     CT abdominal pelvis without IV contrast-10/5/2022  - Small nonobstructing colliculi in left kidney.  Largest is in the upper to mid left kidney and measures approximately 3 to 4 mm.       Assessment:    1. Pars defect with spondylolisthesis    2. Sacroiliac joint dysfunction    3. High risk medication use        Plan:   1. UDS on 6/1/23 consistent with patient interview and positive for tramadol.   Narcotic contract signed on 6/1/2023.  Point-of-care negative for any nonprescribed substances.  Patient had alcohol issues in the past.  Discussed with patient that he is at significantly higher risk of addiction with pain medications and I do not recommend pain medications for longer period of time.  2. We discussed trying a course of formal physical therapy.  Physical therapy can help strengthen and stretch the muscles around the joints. Continue to be as active as possible. Start physical therapy as it will help generalized pain and follow up with HEP.  PT referral sent to [facility] - 6/1/23.  Assigned home exercises program to be started on 6/1/2023.  3.  Patient has  failed Tylenol, ibuprofen, meloxicam.   4.  Restart Tizanidine 4 mg qhs PRN. Side effect profile discussed with the patient including but not limited to transient drowsiness, dizziness, weakness, fatigue, confusion, headache, insomnia, nausea, constipation and urinary frequency. Patient understands and agrees.  5.  Tramadol is not helping. Temporarily Norco 5-325 mg for #28 tabs.   6. Restart Gabapentin 300 mg qhs and increase it to BID. Side effects discussed with the patient including but not limited to somnolence, dizziness, ataxia, headache, fatigue, blurred vision, cold or flu-like symptoms,delusions, hoarseness, lack or loss of strength, lower back or side pain, swelling of the hands, feet, or lower legs trembling or shaking. Patient understands and agrees with the plan.  6.  Discussed cervical spine x-ray and cervical CT scan.  CT of cervical spine shows facet arthritis most notably at C6-C7 level.  Patient has bilateral facet tenderness and facet loading positive bilateral C4-C7.  Discussed with patient that we may consider bilateral MBB C5-C7 in future if pain does not improve with conservative management.  Patient understands and agrees with the plan.  7.  Sent biomed cream with 5 refills.  8. Obtain lumbar MRI wo contrast due to new intermittent radicular pain to b/l legs.         RTC after MRI.    Juan David Dior DO  Pain Management   [facility]        INSPECT REPORT    As part of the patient's treatment plan, I may be prescribing controlled substances. The patient has been made aware of appropriate use of such medications, including potential risk of somnolence, limited ability to drive and/or work safely, and the potential for dependence or overdose. It has also been made clear that these medications are for use by this patient only, without concomitant use of alcohol or other substances unless prescribed.     Patient has completed prescribing agreement detailing terms of continued prescribing of  controlled substances, including monitoring INSPECT reports, urine drug screening, and pill counts if necessary. The patient is aware that inappropriate use will results in cessation of prescribing such medications.    INSPECT report has been reviewed and scanned into the patient's chart.

## 2024-10-14 ENCOUNTER — OFFICE VISIT (OUTPATIENT)
Dept: PAIN MEDICINE | Facility: CLINIC | Age: 44
End: 2024-10-14
Payer: MEDICAID

## 2024-10-14 VITALS
BODY MASS INDEX: 29.02 KG/M2 | DIASTOLIC BLOOD PRESSURE: 72 MMHG | WEIGHT: 196.5 LBS | HEART RATE: 79 BPM | OXYGEN SATURATION: 96 % | RESPIRATION RATE: 16 BRPM | SYSTOLIC BLOOD PRESSURE: 113 MMHG

## 2024-10-14 DIAGNOSIS — Z79.899 HIGH RISK MEDICATION USE: ICD-10-CM

## 2024-10-14 DIAGNOSIS — M43.10 PARS DEFECT WITH SPONDYLOLISTHESIS: ICD-10-CM

## 2024-10-14 DIAGNOSIS — M53.3 SACROILIAC JOINT DYSFUNCTION: ICD-10-CM

## 2024-10-14 PROCEDURE — 1125F AMNT PAIN NOTED PAIN PRSNT: CPT | Performed by: STUDENT IN AN ORGANIZED HEALTH CARE EDUCATION/TRAINING PROGRAM

## 2024-10-14 PROCEDURE — 1160F RVW MEDS BY RX/DR IN RCRD: CPT | Performed by: STUDENT IN AN ORGANIZED HEALTH CARE EDUCATION/TRAINING PROGRAM

## 2024-10-14 PROCEDURE — 1159F MED LIST DOCD IN RCRD: CPT | Performed by: STUDENT IN AN ORGANIZED HEALTH CARE EDUCATION/TRAINING PROGRAM

## 2024-10-14 PROCEDURE — 99214 OFFICE O/P EST MOD 30 MIN: CPT | Performed by: STUDENT IN AN ORGANIZED HEALTH CARE EDUCATION/TRAINING PROGRAM

## 2024-10-14 RX ORDER — GABAPENTIN 300 MG/1
300 CAPSULE ORAL 2 TIMES DAILY
Qty: 60 CAPSULE | Refills: 1 | Status: SHIPPED | OUTPATIENT
Start: 2024-10-14 | End: 2024-12-13

## 2024-10-14 RX ORDER — HYDROCODONE BITARTRATE AND ACETAMINOPHEN 5; 325 MG/1; MG/1
1 TABLET ORAL EVERY 6 HOURS PRN
Qty: 28 TABLET | Refills: 0 | Status: SHIPPED | OUTPATIENT
Start: 2024-10-14 | End: 2024-10-18 | Stop reason: SDUPTHER

## 2024-10-18 ENCOUNTER — TELEPHONE (OUTPATIENT)
Dept: PAIN MEDICINE | Facility: CLINIC | Age: 44
End: 2024-10-18
Payer: MEDICAID

## 2024-10-18 DIAGNOSIS — Z79.899 HIGH RISK MEDICATION USE: ICD-10-CM

## 2024-10-18 DIAGNOSIS — M43.10 PARS DEFECT WITH SPONDYLOLISTHESIS: ICD-10-CM

## 2024-10-18 DIAGNOSIS — M53.3 SACROILIAC JOINT DYSFUNCTION: ICD-10-CM

## 2024-10-18 RX ORDER — HYDROCODONE BITARTRATE AND ACETAMINOPHEN 5; 325 MG/1; MG/1
1 TABLET ORAL 3 TIMES DAILY PRN
Qty: 42 TABLET | Refills: 0 | Status: SHIPPED | OUTPATIENT
Start: 2024-10-21 | End: 2024-11-04

## 2024-10-18 NOTE — TELEPHONE ENCOUNTER
PLEASE CALL / LEAVE VMAIL PATIENT CELL [phone] NOTIFYING PATIENT IF / WHEN REFILL CAN BE SENT FOR     NORCO 5-325 MG (TAKING ONE TABLET 3-4 TIMES PER DAY)     TO GO TO [facility] [id number] (PH: [phone])     PATIENT WAS ONLY PRESCRIBED A WEEK's WORTH OF MEDICATION FROM THIS PAST MON 10/14 APPT BUT PATIENT's LUMBAR MRI WAS NOT ABLE TO BE SCHEDULED TILL 11-05-24 AND FOLLOW UP w/DR MEJIA MON 11-11-24     PATIENT REQUESTING A REFILL / ENOUGH MEDICATION TO LAST TILL FOLLOW UP APPT AFTER MRI     THANKS

## 2024-11-05 ENCOUNTER — HOSPITAL ENCOUNTER (OUTPATIENT)
Dept: MRI IMAGING | Facility: HOSPITAL | Age: 44
Discharge: HOME | End: 2024-11-05
Admitting: STUDENT IN AN ORGANIZED HEALTH CARE EDUCATION/TRAINING PROGRAM
Payer: MEDICAID

## 2024-11-05 DIAGNOSIS — M43.10 PARS DEFECT WITH SPONDYLOLISTHESIS: ICD-10-CM

## 2024-11-05 DIAGNOSIS — M53.3 SACROILIAC JOINT DYSFUNCTION: ICD-10-CM

## 2024-11-05 PROCEDURE — 72148 MRI LUMBAR SPINE W/O DYE: CPT

## 2024-11-06 NOTE — PROGRESS NOTES
Subjective   Sergey Jason is a 44 y.o. male is here for follow up for lower back pain and neck pain.  Last seen on 10/14/2024.  Returning after obtaining lumbar MRI.    On last visit:    Restarted gabapentin and tizanidine- helps some.     Lower back pain is 9/10 on VAS, at maximum is 9/10. Pain is aching, throbbing, sharp, spasms in nature. Pain is referred to b/l buttocks and intermittently to b/l legs. The pain is constant. The pain is improved by SI joint RFA. The pain is worse with cutting grass, playing golf.      Neck pain is 5/10 on VAS, at maximum is 8/10. Pain is tightness, sharp in nature. Pain is referred b/l trapezius. The pain is constnat. The pain is improved by massages. The pain is worse with sitting for long time. +headache from left occipital region to top of his head.     Previous Injection:   5/9/2024- B/L L5-S2 RFA MB with sedation- 80% improvement in pain; only had to take 3 Norco since injection. Sleeping better; back to golfing. VAS down from 8/10 to 2/10.  3/14/2024-bilateral L5-S2 MBB-80% pain relief with diagnostic block for 3-4 days and pain slowly returned.  11/16/2023-LESI L5-S1- mild relief  10/5/23 - b/l SI joint injection -no pain relief.  7/28/2023-bilateral SI joint injections - 95% pain relief with diagnostic block; 95% pain relief for 2 months. Able to play golf, work out, didn't need pain meds.     Hx: Referred by Dr. Tidwell, Angel Jimenez II, DO for lower back pain and neck pain. Pain started about 3 months ago without any inciting events. Has neck pain and lower back currently.   He has tried ibuprofen, baclofen, Medrol Dosepak, hydrocodone, tramadol without significant pain relief.  He was started on tizanidine and tramadol by Dr. Tidwell on 5/19/2023. Patient has been referred to neurology, but can't see till 8/23.       PHQ-9- 6                       SOAPP- 13 - struggled with EtoH in past   [place] back disability scale - 27     PMH:   Kidney stones, Left shoulder  surgeryX2, smoker     Current Medications:   Tizanidine 4 mg   Tramadol 50 mg TID PRN  Trazodone        Past Medications:  Tramadol 50 mg - felt like taking ibuprofen  Tylenol   Ibuprofen  Medrol dose pack  Baclofen - didn't help      Past Modalities:  TENS:                                                                          no                                                  Physical Therapy Within The Last 6 Months              yes - [facility] since 6/13/23 -8 weeks-no significant improvement.  Pain worsened  Psychotherapy                                                            no  Massage Therapy                                                       no     Patient Complains Of:  Uro-Fecal Incontinence          no  Weight Gain/Loss                   no  Fever/Chills                             no  Weakness                               no        PEG Assessment   What number best describes your pain on average in the past week?8  What number best describes how, during the past week, pain has interfered with your enjoyment of life?8  What number best describes how, during the past week, pain has interfered with your general activity?  8             Current Outpatient Medications:     busPIRone (BUSPAR) 15 MG tablet, Take 2 tablets by mouth 2 (Two) Times a Day., Disp: , Rfl:     Multiple Vitamins-Minerals (MULTIVITAMIN WITH MINERALS) tablet tablet, Take 1 tablet by mouth Daily., Disp: , Rfl:     omeprazole (priLOSEC) 40 MG capsule, Take 1 capsule by mouth Daily. before a meal, Disp: , Rfl:     tiZANidine (ZANAFLEX) 4 MG tablet, Take 1 tablet by mouth 3 (Three) Times a Day As Needed for Muscle Spasms., Disp: 90 tablet, Rfl: 1    traMADol (ULTRAM) 50 MG tablet, Take 1 tablet by mouth 3 (Three) Times a Day As Needed for Moderate Pain., Disp: 90 tablet, Rfl: 0    traZODone (DESYREL) 300 MG tablet, Take 1 tablet by mouth Daily. with food, Disp: , Rfl:     gabapentin (NEURONTIN) 600 MG tablet, Take 1 tablet by mouth  3 (Three) Times a Day., Disp: 90 tablet, Rfl: 1    HYDROcodone-acetaminophen (NORCO) 5-325 MG per tablet, Take 1 tablet by mouth 3 (Three) Times a Day As Needed for Severe Pain for up to 30 days., Disp: 90 tablet, Rfl: 0    The following portions of the patient's history were reviewed and updated as appropriate: allergies, current medications, past family history, past medical history, past social history, past surgical history, and problem list.      REVIEW OF PERTINENT MEDICAL DATA    Past Medical History:   Diagnosis Date    Back pain     Kidney stone     Neck pain      Past Surgical History:   Procedure Laterality Date    SHOULDER SURGERY       Family History   Problem Relation Age of Onset    No Known Problems Mother     Heart attack Father      Social History     Socioeconomic History    Marital status:    Tobacco Use    Smoking status: Former     Current packs/day: 0.00     Types: Cigarettes     Quit date: 2024     Years since quittin.7    Smokeless tobacco: Never   Vaping Use    Vaping status: Every Day    Substances: Nicotine   Substance and Sexual Activity    Alcohol use: Not Currently     Comment: 2021    Drug use: Not Currently    Sexual activity: Defer         Review of Systems   Musculoskeletal:  Positive for back pain.         Vitals:    24 0807   BP: 138/98   Pulse: 85   Resp: 16   SpO2: 96%   Weight: 89.4 kg (197 lb)   PainSc:   9                     Objective   Physical Exam  Neck:     Musculoskeletal:         General: Tenderness present.        Back:         Legs:    Neurological:      Deep Tendon Reflexes:      Reflex Scores:       Tricep reflexes are 2+ on the right side and 2+ on the left side.       Bicep reflexes are 2+ on the right side and 2+ on the left side.       Brachioradialis reflexes are 2+ on the right side and 2+ on the left side.       Patellar reflexes are 2+ on the right side and 2+ on the left side.       Achilles reflexes are 2+ on the right side  and 2+ on the left side.     Comments: Motor strength 5/5 b/l LE, UE  Sensory intact b/l LE, UE             Imaging Reviewed:  MRI lumbar spine without contrast-11/5/2024  - [id number] L5 pars defect with 5 mm L5 anterolisthesis.  Mild diffuse disc bulge.  Mild left and moderate to severe right foraminal stenosis.      MRI lumbar spine without contrast-10/27/2023  - Grade 1 chronic L5 spondylosis with grade 1 spondylolisthesis at L5-S1.  - Moderate right neuroforaminal stenosis at this level with potential contact of the exiting right L5 nerve root.  L4-5-mild disc bulge with osteophyte formation.    CT cervical spine without contrast-2/21/2023  - Multilevel spondylosis with areas of disc osteophyte complex formation  - Facet arthropathy at multiple levels most most notable at C6-7.  - Limited spinal canal and neural foraminal evaluation with likely mild to moderate stenosis present.     Lumbar x-ray-5/10/2023  - Disc space narrowing at L5-S1.  - Grade 1 anterolisthesis of L5 on S1 with bilateral pars intercularis defect     X-ray thoracic spine-1/1/2021  - No acute findings     X-ray cervical spine-1/1/2021  - Cervical spondylosis at C5-C6 and C6-7.     CT abdominal pelvis without IV contrast-10/5/2022  - Small nonobstructing colliculi in left kidney.  Largest is in the upper to mid left kidney and measures approximately 3 to 4 mm.       Assessment:    1. Lumbar radiculopathy    2. Pars defect with spondylolisthesis    3. Sacroiliac joint dysfunction    4. High risk medication use          Plan:   1. UDS on 6/1/23 consistent with patient interview and positive for tramadol.   Narcotic contract signed on 6/1/2023.  Point-of-care negative for any nonprescribed substances.  Patient had alcohol issues in the past.  Discussed with patient that he is at significantly higher risk of addiction with pain medications and I do not recommend pain medications for longer period of time.  2. We discussed trying a course of  formal physical therapy.  Physical therapy can help strengthen and stretch the muscles around the joints. Continue to be as active as possible. Start physical therapy as it will help generalized pain and follow up with HE[facility].  PT referral sent to [facility] - 6/1/23.  Assigned home exercises program to be started on 6/1/2023.  3.  Patient has failed Tylenol, ibuprofen, meloxicam.   4. ContinueTizanidine 4 mg qhs PRN. Side effect profile discussed with the patient including but not limited to transient drowsiness, dizziness, weakness, fatigue, confusion, headache, insomnia, nausea, constipation and urinary frequency. Patient understands and agrees.  5.  Tramadol is not helping. Temporarily Norco 5-325 mg for #90 tabs - 11/11/24.   6. Increase Gabapentin to 600 mg TID>  Side effects discussed with the patient including but not limited to somnolence, dizziness, ataxia, headache, fatigue, blurred vision, cold or flu-like symptoms,delusions, hoarseness, lack or loss of strength, lower back or side pain, swelling of the hands, feet, or lower legs trembling or shaking. Patient understands and agrees with the plan.  6.  Discussed cervical spine x-ray and cervical CT scan.  CT of cervical spine shows facet arthritis most notably at C6-C7 level.  Patient has bilateral facet tenderness and facet loading positive bilateral C4-C7.  Discussed with patient that we may consider bilateral MBB C5-C7 in future if pain does not improve with conservative management.  Patient understands and agrees with the plan.  7.  Sent biomed cream with 5 refills.  8. Patient has pain in the lower back with referred pain in the leg, patient has failed conservative therapy including PT and pharmacological management for more than 6 weeks and pain interferes with activities of daily living. MRI shows foraminal narrowing at L5-S1. Discussed  b/l TFESI L5-S1.  Discussed the possibility of infection, bleeding, nerve damage, post dural puncture headache,  increased pain, paraplegia. Patient understands and agrees.           RTC for TFESI.     Juan David Dior DO  Pain Management   [facility]        INSPECT REPORT    As part of the patient's treatment plan, I may be prescribing controlled substances. The patient has been made aware of appropriate use of such medications, including potential risk of somnolence, limited ability to drive and/or work safely, and the potential for dependence or overdose. It has also been made clear that these medications are for use by this patient only, without concomitant use of alcohol or other substances unless prescribed.     Patient has completed prescribing agreement detailing terms of continued prescribing of controlled substances, including monitoring INSPECT reports, urine drug screening, and pill counts if necessary. The patient is aware that inappropriate use will results in cessation of prescribing such medications.    INSPECT report has been reviewed and scanned into the patient's chart.

## 2024-11-11 ENCOUNTER — OFFICE VISIT (OUTPATIENT)
Dept: PAIN MEDICINE | Facility: CLINIC | Age: 44
End: 2024-11-11

## 2024-11-11 VITALS
WEIGHT: 197 LBS | DIASTOLIC BLOOD PRESSURE: 98 MMHG | SYSTOLIC BLOOD PRESSURE: 138 MMHG | OXYGEN SATURATION: 96 % | BODY MASS INDEX: 29.09 KG/M2 | HEART RATE: 85 BPM | RESPIRATION RATE: 16 BRPM

## 2024-11-11 DIAGNOSIS — M53.3 SACROILIAC JOINT DYSFUNCTION: ICD-10-CM

## 2024-11-11 DIAGNOSIS — M43.10 PARS DEFECT WITH SPONDYLOLISTHESIS: ICD-10-CM

## 2024-11-11 DIAGNOSIS — Z79.899 HIGH RISK MEDICATION USE: ICD-10-CM

## 2024-11-11 DIAGNOSIS — M54.16 LUMBAR RADICULOPATHY: Primary | ICD-10-CM

## 2024-11-11 RX ORDER — GABAPENTIN 600 MG/1
600 TABLET ORAL 3 TIMES DAILY
Qty: 90 TABLET | Refills: 1 | Status: SHIPPED | OUTPATIENT
Start: 2024-11-11

## 2024-11-11 RX ORDER — HYDROCODONE BITARTRATE AND ACETAMINOPHEN 5; 325 MG/1; MG/1
1 TABLET ORAL 3 TIMES DAILY PRN
Qty: 90 TABLET | Refills: 0 | Status: SHIPPED | OUTPATIENT
Start: 2024-11-11 | End: 2024-12-11

## 2024-12-03 ENCOUNTER — TELEPHONE (OUTPATIENT)
Dept: PAIN MEDICINE | Facility: CLINIC | Age: 44
End: 2024-12-03

## 2024-12-03 NOTE — TELEPHONE ENCOUNTER
Caller: SARAH    Relationship:  [employer]    Best call back number: [phone]      What was the call regarding: SARAH WITH  [employer] CALLING TO CANCEL PT'S INJECTION WITH DR MEJIA ON 12/05/24. PT DOES NOT HAVE INSURANCE CURRENTLY AND WOULD LIKE TO CANCEL THE INJECTION FOR NOW.